# Patient Record
Sex: MALE | Race: WHITE | NOT HISPANIC OR LATINO | Employment: FULL TIME
[De-identification: names, ages, dates, MRNs, and addresses within clinical notes are randomized per-mention and may not be internally consistent; named-entity substitution may affect disease eponyms.]

---

## 2019-11-04 ENCOUNTER — HEALTH MAINTENANCE LETTER (OUTPATIENT)
Age: 49
End: 2019-11-04

## 2020-03-04 ENCOUNTER — OFFICE VISIT (OUTPATIENT)
Dept: FAMILY MEDICINE | Facility: CLINIC | Age: 50
End: 2020-03-04
Payer: COMMERCIAL

## 2020-03-04 VITALS
HEART RATE: 76 BPM | TEMPERATURE: 96.2 F | SYSTOLIC BLOOD PRESSURE: 130 MMHG | DIASTOLIC BLOOD PRESSURE: 82 MMHG | BODY MASS INDEX: 32.14 KG/M2 | WEIGHT: 217 LBS | HEIGHT: 69 IN

## 2020-03-04 DIAGNOSIS — R06.83 SNORING: ICD-10-CM

## 2020-03-04 DIAGNOSIS — F33.1 MODERATE EPISODE OF RECURRENT MAJOR DEPRESSIVE DISORDER (H): ICD-10-CM

## 2020-03-04 DIAGNOSIS — Z13.220 SCREENING FOR LIPID DISORDERS: ICD-10-CM

## 2020-03-04 DIAGNOSIS — I83.813 VARICOSE VEINS OF BOTH LOWER EXTREMITIES WITH PAIN: ICD-10-CM

## 2020-03-04 DIAGNOSIS — Z13.1 SCREENING FOR DIABETES MELLITUS: ICD-10-CM

## 2020-03-04 DIAGNOSIS — R45.851 SUICIDAL IDEATION: ICD-10-CM

## 2020-03-04 DIAGNOSIS — Z12.5 SCREENING FOR PROSTATE CANCER: ICD-10-CM

## 2020-03-04 DIAGNOSIS — Z23 NEED FOR VACCINATION: ICD-10-CM

## 2020-03-04 DIAGNOSIS — F43.10 PTSD (POST-TRAUMATIC STRESS DISORDER): ICD-10-CM

## 2020-03-04 DIAGNOSIS — Z00.00 ROUTINE GENERAL MEDICAL EXAMINATION AT A HEALTH CARE FACILITY: Primary | ICD-10-CM

## 2020-03-04 DIAGNOSIS — Z12.11 ENCOUNTER FOR SCREENING COLONOSCOPY: ICD-10-CM

## 2020-03-04 DIAGNOSIS — Z11.3 SCREEN FOR STD (SEXUALLY TRANSMITTED DISEASE): ICD-10-CM

## 2020-03-04 LAB
ALBUMIN SERPL-MCNC: 4.1 G/DL (ref 3.4–5)
ALP SERPL-CCNC: 84 U/L (ref 40–150)
ALT SERPL W P-5'-P-CCNC: 40 U/L (ref 0–70)
ANION GAP SERPL CALCULATED.3IONS-SCNC: 6 MMOL/L (ref 3–14)
AST SERPL W P-5'-P-CCNC: 15 U/L (ref 0–45)
BILIRUB SERPL-MCNC: 1 MG/DL (ref 0.2–1.3)
BUN SERPL-MCNC: 10 MG/DL (ref 7–30)
CALCIUM SERPL-MCNC: 9.1 MG/DL (ref 8.5–10.1)
CHLORIDE SERPL-SCNC: 104 MMOL/L (ref 94–109)
CHOLEST SERPL-MCNC: 153 MG/DL
CO2 SERPL-SCNC: 27 MMOL/L (ref 20–32)
CREAT SERPL-MCNC: 0.79 MG/DL (ref 0.66–1.25)
GFR SERPL CREATININE-BSD FRML MDRD: >90 ML/MIN/{1.73_M2}
GLUCOSE SERPL-MCNC: 102 MG/DL (ref 70–99)
HBV SURFACE AG SERPL QL IA: NONREACTIVE
HCV AB SERPL QL IA: NONREACTIVE
HDLC SERPL-MCNC: 35 MG/DL
HIV 1+2 AB+HIV1 P24 AG SERPL QL IA: NONREACTIVE
LDLC SERPL CALC-MCNC: 101 MG/DL
NONHDLC SERPL-MCNC: 118 MG/DL
POTASSIUM SERPL-SCNC: 3.6 MMOL/L (ref 3.4–5.3)
PROT SERPL-MCNC: 7.6 G/DL (ref 6.8–8.8)
PSA SERPL-ACNC: 0.77 UG/L (ref 0–4)
SODIUM SERPL-SCNC: 137 MMOL/L (ref 133–144)
TRIGL SERPL-MCNC: 83 MG/DL

## 2020-03-04 PROCEDURE — 90714 TD VACC NO PRESV 7 YRS+ IM: CPT | Performed by: NURSE PRACTITIONER

## 2020-03-04 PROCEDURE — 87340 HEPATITIS B SURFACE AG IA: CPT | Performed by: NURSE PRACTITIONER

## 2020-03-04 PROCEDURE — G0103 PSA SCREENING: HCPCS | Performed by: NURSE PRACTITIONER

## 2020-03-04 PROCEDURE — 87491 CHLMYD TRACH DNA AMP PROBE: CPT | Performed by: NURSE PRACTITIONER

## 2020-03-04 PROCEDURE — 90471 IMMUNIZATION ADMIN: CPT | Performed by: NURSE PRACTITIONER

## 2020-03-04 PROCEDURE — 36415 COLL VENOUS BLD VENIPUNCTURE: CPT | Performed by: NURSE PRACTITIONER

## 2020-03-04 PROCEDURE — 87591 N.GONORRHOEAE DNA AMP PROB: CPT | Performed by: NURSE PRACTITIONER

## 2020-03-04 PROCEDURE — 99386 PREV VISIT NEW AGE 40-64: CPT | Mod: 25 | Performed by: NURSE PRACTITIONER

## 2020-03-04 PROCEDURE — 87389 HIV-1 AG W/HIV-1&-2 AB AG IA: CPT | Performed by: NURSE PRACTITIONER

## 2020-03-04 PROCEDURE — 86780 TREPONEMA PALLIDUM: CPT | Performed by: NURSE PRACTITIONER

## 2020-03-04 PROCEDURE — 80053 COMPREHEN METABOLIC PANEL: CPT | Performed by: NURSE PRACTITIONER

## 2020-03-04 PROCEDURE — 80061 LIPID PANEL: CPT | Performed by: NURSE PRACTITIONER

## 2020-03-04 PROCEDURE — 86803 HEPATITIS C AB TEST: CPT | Performed by: NURSE PRACTITIONER

## 2020-03-04 ASSESSMENT — ANXIETY QUESTIONNAIRES
2. NOT BEING ABLE TO STOP OR CONTROL WORRYING: SEVERAL DAYS
7. FEELING AFRAID AS IF SOMETHING AWFUL MIGHT HAPPEN: SEVERAL DAYS
1. FEELING NERVOUS, ANXIOUS, OR ON EDGE: SEVERAL DAYS
5. BEING SO RESTLESS THAT IT IS HARD TO SIT STILL: SEVERAL DAYS
3. WORRYING TOO MUCH ABOUT DIFFERENT THINGS: SEVERAL DAYS
GAD7 TOTAL SCORE: 7
IF YOU CHECKED OFF ANY PROBLEMS ON THIS QUESTIONNAIRE, HOW DIFFICULT HAVE THESE PROBLEMS MADE IT FOR YOU TO DO YOUR WORK, TAKE CARE OF THINGS AT HOME, OR GET ALONG WITH OTHER PEOPLE: SOMEWHAT DIFFICULT
6. BECOMING EASILY ANNOYED OR IRRITABLE: SEVERAL DAYS

## 2020-03-04 ASSESSMENT — MIFFLIN-ST. JEOR: SCORE: 1838.65

## 2020-03-04 ASSESSMENT — PATIENT HEALTH QUESTIONNAIRE - PHQ9
5. POOR APPETITE OR OVEREATING: SEVERAL DAYS
SUM OF ALL RESPONSES TO PHQ QUESTIONS 1-9: 11

## 2020-03-04 NOTE — PROGRESS NOTES
3  SUBJECTIVE:   CC: Lakhwinder Solorio is an 50 year old male who presents for preventive health visit.     Healthy Habits:    Do you get at least three servings of calcium containing foods daily (dairy, green leafy vegetables, etc.)? No     Amount of exercise or daily activities, outside of work: 1 day(s) per week    Problems taking medications regularly No    Medication side effects: Yes ED     Have you had an eye exam in the past two years? no    Do you see a dentist twice per year? yes    Do you have sleep apnea, excessive snoring or daytime drowsiness?yes, wife states he stops breathing       PROBLEMS TO ADD ON...     1. Varicose veins (right leg only). These can be painful at time (especially after standing for long periods).     2. Snoring / stopping breathing. His wife reports that he often stops breathing at night. He also snores nightly.     3. PTSD / Depression / Suicidal Ideation. He has recently been seeing a psychiatrist for these issues and has started fluoxetine. He is seeing that provider tomorrow. No acute issues today. He does note ED since starting this medication.       Today's PHQ-2 Score:   PHQ-2 ( 1999 Pfizer) 5/26/2015 2/11/2013   Q1: Little interest or pleasure in doing things 0 0   Q2: Feeling down, depressed or hopeless 0 0   PHQ-2 Score 0 0       Abuse: Current or Past(Physical, Sexual or Emotional)- NO  Do you feel safe in your environment? YES        Social History     Tobacco Use     Smoking status: Never Smoker     Smokeless tobacco: Never Used   Substance Use Topics     Alcohol use: Yes     Comment: social     If you drink alcohol do you typically have >3 drinks per day or >7 drinks per week? No                      Last PSA: No results found for: PSA    Reviewed orders with patient. Reviewed health maintenance and updated orders accordingly - Yes  Lab work is in process    Reviewed and updated as needed this visit by clinical staff  Tobacco  Allergies  Meds  Problems  Med Hx   "Surg Hx  Fam Hx  Soc Hx          Reviewed and updated as needed this visit by Provider  Tobacco  Allergies  Meds  Problems  Med Hx  Surg Hx  Fam Hx          ROS:  CONSTITUTIONAL: NEGATIVE for fever, chills, change in weight  INTEGUMENTARY/SKIN: NEGATIVE for worrisome rashes, moles or lesions  EYES: NEGATIVE for vision changes or irritation  ENT: NEGATIVE for ear, mouth and throat problems  RESP: See HPI regarding snoring and probable apnea  CV: NEGATIVE for chest pain, palpitations or peripheral edema; See HPI regarding varicose veins.   GI: NEGATIVE for nausea, abdominal pain, heartburn, or change in bowel habits   male: negative for dysuria, hematuria, decreased urinary stream, erectile dysfunction, urethral discharge  MUSCULOSKELETAL: NEGATIVE for significant arthralgias or myalgia  NEURO: NEGATIVE for weakness, dizziness or paresthesias  PSYCHIATRIC: See HPI    OBJECTIVE:   /82 (BP Location: Left arm, Patient Position: Chair, Cuff Size: Adult Regular)   Pulse 76   Temp 96.2  F (35.7  C) (Tympanic)   Ht 1.759 m (5' 9.25\")   Wt 98.4 kg (217 lb)   BMI 31.81 kg/m    EXAM:  GENERAL: healthy, alert and no distress  EYES: Eyes grossly normal to inspection, PERRL and conjunctivae and sclerae normal  HENT: ear canals and TM's normal, nose and mouth without ulcers or lesions; Large tonsils.   NECK: no adenopathy, no asymmetry, masses, or scars and thyroid normal to palpation  RESP: lungs clear to auscultation - no rales, rhonchi or wheezes  CV: regular rate and rhythm, normal S1 S2, no S3 or S4, no murmur, click or rub, no peripheral edema and peripheral pulses strong; Extensive varicosities (no inflammation) in right lower extremity (below the knee)  ABDOMEN: soft, nontender, no hepatosplenomegaly, no masses and bowel sounds normal  MS: no gross musculoskeletal defects noted, no edema  SKIN: no suspicious lesions or rashes  NEURO: Normal strength and tone, mentation intact and speech normal  PSYCH: " mentation appears normal, affect normal/bright    Diagnostic Test Results:  No results found for this or any previous visit (from the past 24 hour(s)).    ASSESSMENT/PLAN:   Lakhwinder was seen today for physical.    Diagnoses and all orders for this visit:    Routine general medical examination at a health care facility  Comment: Feels generally-well. Exam without concerning findings. See below for acute and chronic issues. Will screen fasting BG and lipids. Td given today.     Screening for diabetes mellitus  -     Comprehensive metabolic panel    Screening for lipid disorders  -     Lipid panel reflex to direct LDL Fasting    Suicidal ideation  Comment: Follows with psychiatry. No acute issues today.     Moderate episode of recurrent major depressive disorder (H)  Comment: Follows with psychiatry. No acute issues today.     PTSD (post-traumatic stress disorder)  Comment: Follows with psychiatry. No acute issues today.     Varicose veins of both lower extremities with pain  Comment: Began after an injury (fall from a ladder) to his right thigh. Will send to vascular surgery for further evaluation.   -     VASCULAR SURGERY REFERRAL; Future    Snoring  Comment: PENG likely based on his account of his symptoms (and narrowed airway on exam)  -     SLEEP EVALUATION & MANAGEMENT REFERRAL - ADULT -[facility] [phone]  (Age 18 and up); Future    Screen for STD (sexually transmitted disease)  -     Hepatitis C antibody  -     HIV Antigen Antibody Combo  -     Hepatitis B surface antigen  -     Treponema Abs w Reflex to RPR and Titer  -     NEISSERIA GONORRHOEA PCR  -     CHLAMYDIA TRACHOMATIS PCR    Encounter for screening colonoscopy  -     GASTROENTEROLOGY ADULT REF PROCEDURE ONLY Delmi Perez [phone]; No Provider Preference    Screening for prostate cancer  -     Prostate spec antigen screen    Need for vaccination  -     TD PRSERV FREE >=7 YRS ADS IM [35340]  -     1st  Administration  " [03593]        COUNSELING:  Reviewed preventive health counseling, as reflected in patient instructions    Estimated body mass index is 31.81 kg/m  as calculated from the following:    Height as of this encounter: 1.759 m (5' 9.25\").    Weight as of this encounter: 98.4 kg (217 lb).    Weight management plan: Discussed healthy diet and exercise guidelines     reports that he has never smoked. He has never used smokeless tobacco.      Counseling Resources:  ATP IV Guidelines  Pooled Cohorts Equation Calculator  FRAX Risk Assessment  ICSI Preventive Guidelines  Dietary Guidelines for Americans, 2010  USDA's MyPlate  ASA Prophylaxis  Lung CA Screening    Jerad Villalobos NP  [facility] [place]  "

## 2020-03-05 ENCOUNTER — VIRTUAL VISIT (OUTPATIENT)
Dept: FAMILY MEDICINE | Facility: CLINIC | Age: 50
End: 2020-03-05
Payer: COMMERCIAL

## 2020-03-05 DIAGNOSIS — N52.2 DRUG-INDUCED ERECTILE DYSFUNCTION: Primary | ICD-10-CM

## 2020-03-05 LAB
C TRACH DNA SPEC QL NAA+PROBE: NEGATIVE
N GONORRHOEA DNA SPEC QL NAA+PROBE: NEGATIVE
SPECIMEN SOURCE: NORMAL
SPECIMEN SOURCE: NORMAL
T PALLIDUM AB SER QL: NONREACTIVE

## 2020-03-05 PROCEDURE — 99442 ZZC PHYSICIAN TELEPHONE EVALUATION 11-20 MIN: CPT | Performed by: NURSE PRACTITIONER

## 2020-03-05 RX ORDER — SILDENAFIL 50 MG/1
50 TABLET, FILM COATED ORAL DAILY PRN
Qty: 30 TABLET | Refills: 1 | Status: SHIPPED | OUTPATIENT
Start: 2020-03-05 | End: 2022-04-08

## 2020-03-05 ASSESSMENT — ANXIETY QUESTIONNAIRES: GAD7 TOTAL SCORE: 7

## 2020-03-05 ASSESSMENT — ASTHMA QUESTIONNAIRES: ACT_TOTALSCORE: 25

## 2020-03-05 NOTE — PROGRESS NOTES
"Lakhwinder Solorio is a 50 year old male who is being evaluated via a billable telephone visit.      The patient has been notified of following:     \"This telephone visit will be conducted via a call between you and your physician/provider. We have found that certain health care needs can be provided without the need for a physical exam.  This service lets us provide the care you need with a short phone conversation.  If a prescription is necessary we can send it directly to your pharmacy.  If lab work is needed we can place an order for that and you can then stop by our lab to have the test done at a later time.    If during the course of the call the physician/provider feels a telephone visit is not appropriate, you will not be charged for this service.\"     Consent has been obtained for this service by 1 care team member: yes. See the scanned image in the medical record.    Lakhwinder Solorio complains of    Chief Complaint   Patient presents with     Consult     discuss EP and Prozac side effects        I have reviewed and updated the patient's Past Medical History, Social History, Family History and Medication List.    ALLERGIES  Patient has no known allergies.    Jayy FERNANDES CMA  (MA signature)    HPI: Lakhwinder was recently started on fluoxetine 20 mg by his psychiatrist. Since that time, however, he has begun to experience erectile dysfunction with every sexual encounter. He states that he is unable to achieve an erection at all. He suspects that he would like to talk about a medication (suggested by his psychiatrist today who did not wish to amend his depression medication regimen due to this). He suspects that he would need to use a medication 2-3 times per week. He has never used an agent for this purpose before.     Additional provider notes:       Assessment/Plan:  (N52.2) Drug-induced erectile dysfunction  (primary encounter diagnosis)  Comment: ED as side effect of new depression medication. He visited with psychiatrist " who did not wish to switch therapy or add bupropion. Instead, she recommended that he trial an ED medication for the time being. I think that this is reasonable, and he appears to be a safe candidate. I have recommended that he check his BP frequently after starting this and alert me with the development of any potential side effects. He agrees to do so. Discussed risks, benefits, alternatives, potential side effects, and proper administration of new medication / treatment (including avoiding the use of nitrates). Agrees with plan of care. All questions answered.     Plan: sildenafil (VIAGRA) 50 MG tablet          I have reviewed the note as documented above.  This accurately captures the substance of my conversation with the patient.      Total time of call between patient and provider was 12 minutes     Jerad Villalobos NP

## 2020-03-09 ENCOUNTER — HOSPITAL ENCOUNTER (OUTPATIENT)
Facility: CLINIC | Age: 50
End: 2020-03-09
Attending: SPECIALIST | Admitting: SPECIALIST
Payer: COMMERCIAL

## 2020-03-10 ENCOUNTER — OFFICE VISIT (OUTPATIENT)
Dept: VASCULAR SURGERY | Facility: CLINIC | Age: 50
End: 2020-03-10
Attending: NURSE PRACTITIONER
Payer: COMMERCIAL

## 2020-03-10 ENCOUNTER — MEDICAL CORRESPONDENCE (OUTPATIENT)
Dept: HEALTH INFORMATION MANAGEMENT | Facility: CLINIC | Age: 50
End: 2020-03-10

## 2020-03-10 ENCOUNTER — TRANSFERRED RECORDS (OUTPATIENT)
Dept: HEALTH INFORMATION MANAGEMENT | Facility: CLINIC | Age: 50
End: 2020-03-10

## 2020-03-10 DIAGNOSIS — I83.893 VARICOSE VEINS OF LEG WITH EDEMA, BILATERAL: Primary | ICD-10-CM

## 2020-03-10 DIAGNOSIS — I83.813 VARICOSE VEINS OF BOTH LOWER EXTREMITIES WITH PAIN: ICD-10-CM

## 2020-03-10 PROCEDURE — 99207 ZZC VEINSOLUTIONS FREE SCREENING: CPT | Performed by: SURGERY

## 2020-03-10 NOTE — PROGRESS NOTES
[facility] Free screening    Lakhwinder Solorio is a 50-year-old gentleman who has had varicose veins of his right greater than left lower extremity over the last several years.  He is referred by Jerad Villalobos NP.  The patient's greatest concern is of the pain and swelling of his right ankle but he is also developed a dermatitis which has been difficult to heal.    He wears compression on a daily basis hands and has done so for years.  The elastic at the top of the compression stocking rubs on the varicosity just below his knee increasing his discomfort.  He has not been able to get this to heal.    He has no history of deep vein thrombosis or superficial thrombophlebitis.    12 point review of systems was significant for night sweats, fatigue, 20 pound weight loss, rare chest discomfort, headaches, diarrhea, constipation, blood in stool, indigestion/reflux, heartburn, poor appetite, back and neck pain, leg pain, leg swelling, rash and pruritus    VCSS 8, CEAP 4 right lower extremity; VCSS 4, CEAP 3 left lower extremity    We discussed options of continued conservative measures with use of compression hose, leg elevation, dietary measures and exercise.  He is work to lose weight to help with his pain and has been wearing compression on a regular basis.  Risks of conservative measures including worsening of the dermatitis, enlargement of the varicose veins, superficial thrombophlebitis or bleeding were discussed.  He voiced understanding and his questions were answered.    He has greater than 10 mm varicosities in a cluster just below the right knee and 8 mm varicosities extending down the medial leg somewhat anteriorly to the ankle.  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    He will return for a bilateral lower extremity venous duplex ultrasound    LYNDSAY Adikns MD    [facility] NEW PATIENT:

## 2020-03-10 NOTE — LETTER
3/10/2020         RE: Lakhwinder Solorio  [address] [id number]        Dear Colleague,    Thank you for referring your patient, Lakhwinder Solorio, to the [facility]. Please see a copy of my visit note below.    Vein solutions Free screening    Lakhwinder Solorio is a 50-year-old gentleman who has had varicose veins of his right greater than left lower extremity over the last several years.  He is referred by Jerad Villalobos NP.  The patient's greatest concern is of the pain and swelling of his right ankle but he is also developed a dermatitis which has been difficult to heal.    He wears compression on a daily basis hands and has done so for years.  The elastic at the top of the compression stocking rubs on the varicosity just below his knee increasing his discomfort.  He has not been able to get this to heal.    He has no history of deep vein thrombosis or superficial thrombophlebitis.    12 point review of systems was significant for night sweats, fatigue, 20 pound weight loss, rare chest discomfort, headaches, diarrhea, constipation, blood in stool, indigestion/reflux, heartburn, poor appetite, back and neck pain, leg pain, leg swelling, rash and pruritus    VCSS 8, CEAP 4 right lower extremity; VCSS 4, CEAP 3 left lower extremity    We discussed options of continued conservative measures with use of compression hose, leg elevation, dietary measures and exercise.  He is work to lose weight to help with his pain and has been wearing compression on a regular basis.  Risks of conservative measures including worsening of the dermatitis, enlargement of the varicose veins, superficial thrombophlebitis or bleeding were discussed.  He voiced understanding and his questions were answered.    He has greater than 10 mm varicosities in a cluster just below the right knee and 8 mm varicosities extending down the medial leg somewhat anteriorly to the ankle.  There is corona phlebectasia of  the right medial ankle with 1-2+ edema.    He will return for a bilateral lower extremity venous duplex ultrasound    LYNDSAY Adkins MD    [facility] NEW PATIENT:                Again, thank you for allowing me to participate in the care of your patient.        Sincerely,        Alfonso Adkins MD

## 2020-07-10 ENCOUNTER — VIRTUAL VISIT (OUTPATIENT)
Dept: FAMILY MEDICINE | Facility: OTHER | Age: 50
End: 2020-07-10

## 2020-07-10 NOTE — PROGRESS NOTES
"Date: 07/10/2020 16:18:41  Clinician: Amelia Briscoe  Clinician NPI: [id number]  Patient: Lakhwinder Solorio  Patient : 1970  Patient [address] [facility] [address] [place] [address]  Patient Phone: [phone]  Visit Protocol: URI  Patient Summary:  Lakhwinder is a 50 year old ( : 1970 ) male who initiated a Visit for COVID-19 (Coronavirus) evaluation and screening. When asked the question \"Please sign me up to receive news, health information and promotions. \", Lakhwinder responded \"No\".    Lakhwinder states his symptoms started gradually 3-4 days ago.   His symptoms consist of nausea, diarrhea, malaise, myalgia, and a cough.   Symptom details   Cough: Lakhwinder coughs a few times an hour and his cough is not more bothersome at night. Phlegm does not come into his throat when he coughs. He does not believe his cough is caused by post-nasal drip.    Lakhwinder denies having wheezing, teeth pain, ageusia, vomiting, rhinitis, ear pain, headache, chills, sore throat, enlarged lymph nodes, anosmia, facial pain or pressure, fever, and nasal congestion. He also denies having recent facial or sinus surgery in the past 60 days, taking antibiotic medication in the past month, and double sickening (worsening symptoms after initial improvement). He is not experiencing dyspnea.   Precipitating events  He has not recently been exposed to someone with influenza. Lakhwinder has not been in close contact with any high risk individuals.   Pertinent COVID-19 (Coronavirus) information  In the past 14 days, Lakhwinder has not worked in a congregate living setting.   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 Additional job details as reported by the patient (free text): ,    Lakhwinder also has not lived in a congregate living setting in the past 14 days. He does not live with a healthcare worker.   Lakhwinder has not had a close contact with a " laboratory-confirmed COVID-19 patient within 14 days of symptom onset.   Pertinent medical history  Lakhwinder does not need a return to work/school note.   Weight: 205 lbs   Lakhwinder does not smoke or use smokeless tobacco.   Weight: 205 lbs    MEDICATIONS: fluoxetine oral, ALLERGIES: NKDA  Clinician Response:  Dear Lakhwinder,   Your symptoms show that you may have coronavirus (COVID-19). This illness can cause fever, cough and trouble breathing. Many people get a mild case and get better on their own. Some people can get very sick.  What should I do?  We would like to test you for this virus.   1. Please call [phone] to schedule your visit. Explain that you were referred by [facility] to have a COVID-19 test. [facility].  The following will serve as your written order for this COVID Test, ordered by me, for the indication of suspected COVID [Z20.828]: The test will be ordered in [employer], our electronic health record, after you are scheduled. It will show as ordered and authorized by Wilber Martinez MD.  Order: COVID-19 (Coronavirus) PCR for SYMPTOMATIC testing from [facility].      2. When it's time for your COVID test:  Stay at least 6 feet away from others. (If someone will drive you to your test, stay in the backseat, as far away from the  as you can.)   Cover your mouth and nose with a mask, tissue or washcloth.  Go straight to the testing site. Don't make any stops on the way there or back.      3.Starting now: Stay home and away from others (self-isolate) until:   You've had no fever---and no medicine that reduces fever---for 3 full days (72 hours). And...   Your other symptoms have gotten better. For example, your cough or breathing has improved. And...   At least 10 days have passed since your symptoms started.       During this time, don't leave the house except for testing or medical care.   Stay in your own room, even for meals. Use your own bathroom if you can.   Stay away from others  "in your home. No hugging, kissing or shaking hands. No visitors.  Don't go to work, school or anywhere else.    Clean \"high touch\" surfaces often (doorknobs, counters, handles, etc.). Use a household cleaning spray or wipes. You'll find a full list of  on the EPA website: www.epa.gov/pesticide-registration/list-n-disinfectants-use-against-sars-cov-2.   Cover your mouth and nose with a mask, tissue or washcloth to avoid spreading germs.  Wash your hands and face often. Use soap and water.  Caregivers in these groups are at risk for severe illness due to COVID-19:  o People 65 years and older  o People who live in a nursing home or long-term care facility  o People with chronic disease (lung, heart, cancer, diabetes, kidney, liver, immunologic)  o People who have a weakened immune system, including those who:   Are in cancer treatment  Take medicine that weakens the immune system, such as corticosteroids  Had a bone marrow or organ transplant  Have an immune deficiency  Have poorly controlled HIV or AIDS  Are obese (body mass index of 40 or higher)  Smoke regularly   o Caregivers should wear gloves while washing dishes, handling laundry and cleaning bedrooms and bathrooms.  o Use caution when washing and drying laundry: Don't shake dirty laundry, and use the warmest water setting that you can.  o For more tips, go to www.cdc.gov/coronavirus/2019-ncov/downloads/10Things.pdf.       How can I take care of myself?   Get lots of rest. Drink extra fluids (unless a doctor has told you not to).   Take Tylenol (acetaminophen) for fever or pain. If you have liver or kidney problems, ask your family doctor if it's okay to take Tylenol.   Adults can take either:    650 mg (two 325 mg pills) every 4 to 6 hours, or...   1,000 mg (two 500 mg pills) every 8 hours as needed.    Note: Don't take more than 3,000 mg in one day. Acetaminophen is found in many medicines (both prescribed and over-the-counter medicines). Read all " labels to be sure you don't take too much.   For children, check the Tylenol bottle for the right dose. The dose is based on the child's age or weight.    If you have other health problems (like cancer, heart failure, an organ transplant or severe kidney disease): Call your specialty clinic if you don't feel better in the next 2 days.       Know when to call 911. Emergency warning signs include:    Trouble breathing or shortness of breath Pain or pressure in the chest that doesn't go away Feeling confused like you haven't felt before, or not being able to wake up Bluish-colored lips or face.  Where can I get more information?   [facility] -- About COVID-19: [email or web address]   CDC -- What to Do If You're Sick: www.cdc.gov/coronavirus/2019-ncov/about/steps-when-sick.html   [facility] -- Ending Home Isolation: www.cdc.gov/coronavirus/2019-ncov/hcp/disposition-in-home-patients.html   [facility] -- Caring for Someone: www.cdc.gov/coronavirus/2019-ncov/if-you-are-sick/care-for-someone.html   [facility] -- Interim Guidance for Hospital Discharge to Home: www.[facility].[place]/diseases/coronavirus/hcp/hospdischarge.pdf   [facility] clinical trials (COVID-19 research studies): clinicalaffairs.[facility]-clinical-trials    Below are the COVID-19 hotlines at the [facility] ([facility]). Interpreters are available.    For health questions: Call [phone] or 1-660.644.1965 (7 a.m. to 7 p.m.) For questions about schools and childcare: Call 487-032-6153 or 1-850.569.7097 (7 a.m. to 7 p.m.)    Diagnosis: Myalgia  Diagnosis ICD: M79.1

## 2020-07-11 ENCOUNTER — AMBULATORY - HEALTHEAST (OUTPATIENT)
Dept: FAMILY MEDICINE | Facility: CLINIC | Age: 50
End: 2020-07-11

## 2020-07-11 DIAGNOSIS — Z20.822 SUSPECTED COVID-19 VIRUS INFECTION: ICD-10-CM

## 2020-07-12 ENCOUNTER — AMBULATORY - HEALTHEAST (OUTPATIENT)
Dept: FAMILY MEDICINE | Facility: CLINIC | Age: 50
End: 2020-07-12

## 2020-07-12 DIAGNOSIS — Z20.822 SUSPECTED COVID-19 VIRUS INFECTION: ICD-10-CM

## 2020-07-16 ENCOUNTER — COMMUNICATION - HEALTHEAST (OUTPATIENT)
Dept: FAMILY MEDICINE | Facility: CLINIC | Age: 50
End: 2020-07-16

## 2020-09-08 DIAGNOSIS — Z11.59 ENCOUNTER FOR SCREENING FOR OTHER VIRAL DISEASES: Primary | ICD-10-CM

## 2020-09-14 VITALS — HEIGHT: 70 IN | BODY MASS INDEX: 29.35 KG/M2 | WEIGHT: 205 LBS

## 2020-09-14 ASSESSMENT — MIFFLIN-ST. JEOR: SCORE: 1796.12

## 2020-09-14 NOTE — PATIENT INSTRUCTIONS
"MY TREATMENT INFORMATION FOR SLEEP APNEA-  Lakhwinder Solorio    DOCTOR : Lazaro Keane MD, MD  SLEEP CENTER :      MY CONTACT NUMBER:     Am I having a sleep study at a sleep center?  Make sure you have an appointment for the study before you leave!    Am I having a home sleep study?  Watch this video:  /drop off device-   Https://www.truedashube.com/watch?v=yGGFBdELGhk  Disposable device sent out require phone/computer application-   https://www.truedashube.com/watch?v=BCce_vbiwxE  Please verify your insurance coverage with your insurance carrier    Frequently asked questions:  1. What is Obstructive Sleep Apnea (PENG)? PENG is the most common type of sleep apnea. Apnea means, \"without breath.\"  Apnea is most often caused by narrowing or collapse of the upper airway as muscles relax during sleep.   Almost everyone has occasional apneas. Most people with sleep apnea have had brief interruptions at night frequently for many years.  The severity of sleep apnea is related to how frequent and severe the events are.   2. What are the consequences of PENG? Symptoms include: feeling sleepy during the day, snoring loudly, gasping or stopping of breathing, trouble sleeping, and occasionally morning headaches or heartburn at night.  Sleepiness can be serious and even increase the risk of falling asleep while driving. Other health consequences may include development of high blood pressure and other cardiovascular disease in persons who are susceptible. Untreated PENG  can contribute to heart disease, stroke and diabetes.   3. What are the treatment options? In most situations, sleep apnea is a lifelong disease that must be managed with daily therapy. Medications are not effective for sleep apnea and surgery is generally not considered until other therapies have been tried. Your treatment is your choice . Continuous Positive Airway (CPAP) works right away and is the therapy that is effective in nearly everyone. An oral device to hold " your jaw forward is usually the next most reliable option. Other options include postioning devices (to keep you off your back), weight loss, and surgery including a tongue pacing device. There is more detail about some of these options below.    Important tips for using CPAP and similar devices   Know your equipment:  CPAP is continuous positive airway pressure that prevents obstructive sleep apnea by keeping the throat from collapsing while you are sleeping. In most cases, the device is  smart  and can slowly self-adjusts if your throat collapses and keeps a record every day of how well you are treated-this information is available to you and your care team.  BPAP is bilevel positive airway pressure that keeps your throat open and also assists each breath with a pressure boost to maintain adequate breathing.  Special kinds of BPAP are used in patients who have inadequate breathing from lung or heart disease. In most cases, the device is  smart  and can slowly self-adjusts to assist breathing. Like CPAP, the device keeps a record of how well you are treated.  Your mask is your connection to the device. You get to choose what feels most comfortable and the staff will help to make sure if fits. Here: are some examples of the different masks that are available:       Key points to remember on your journey with sleep apnea:  1. Sleep study.  PAP devices often need to be adjusted during a sleep study to show that they are effective and adjusted right.  2. Good tips to remember: Try wearing just the mask during a quiet time during the day so your body adapts to wearing it. A humidifier is recommended for comfort in most cases to prevent drying of your nose and throat. Allergy medication from your provider may help you if you are having nasal congestion.  3. Getting settled-in. It takes more than one night for most of us to get used to wearing a mask. Try wearing just the mask during a quiet time during the day so your  body adapts to wearing it. A humidifier is recommended for comfort in most cases. Our team will work with you carefully on the first day and will be in contact within 4 days and again at 2 and 4 weeks for advice and remote device adjustments. Your therapy is evaluated by the device each day.   4. Use it every night. The more you are able to sleep naturally for 7-8 hours, the more likely you will have good sleep and to prevent health risks or symptoms from sleep apnea. Even if you use it 4 hours it helps. Occasionally all of us are unable to use a medical therapy, in sleep apnea, it is not dangerous to miss one night.   5. Communicate. Call our skilled team on the number provided on the first day if your visit for problems that make it difficult to wear the device. Over 2 out of 3 patients can learn to wear the device long-term with help from our team. Remember to call our team or your sleep providers if you are unable to wear the device as we may have other solutions for those who cannot adapt to mask CPAP therapy. It is recommended that you sleep your sleep provider within the first 3 months and yearly after that if you are not having problems.   6. Use it for your health. We encourage use of CPAP masks during daytime quiet periods to allow your face and brain to adapt to the sensation of CPAP so that it will be a more natural sensation to awaken to at night or during naps. This can be very useful during the first few weeks or months of adapting to CPAP though it does not help medically to wear CPAP during wakefulness and  should not be used as a strategy just to meet guidelines.  7. Take care of your equipment. Make sure you clean your mask and tubing using directions every day and that your filter and mask are replaced as recommended or if they are not working.     BESIDES CPAP, WHAT OTHER THERAPIES ARE THERE?    Positioning Device  Positioning devices are generally used when sleep apnea is mild and only occurs  on your back.This example shows a pillow that straps around the waist. It may be appropriate for those whose sleep study shows milder sleep apnea that occurs primarily when lying flat on one's back. Preliminary studies have shown benefit but effectiveness at home may need to be verified by a home sleep test. These devices are generally not covered by medical insurance.  Examples of devices that maintain sleeping on the back to prevent snoring and mild sleep apnea.    Belt type body positioner  Http://Namshi[employer]/    Electronic reminder  Http://nightshifttherapy.com/  Http://www.Boats.com[employer]au/      Oral Appliance  What is oral appliance therapy?  An oral appliance device fits on your teeth at night like a retainer used after having braces. The device is made by a specialized dentist and requires several visits over 1-2 months before a manufactured device is made to fit your teeth and is adjusted to prevent your sleep apnea. Once an oral device is working properly, snoring should be improved. A home sleep test may be recommended at that time if to determine whether the sleep apnea is adequately treated.       Some things to remember:  -Oral devices are often, but not always, covered by your medical insurance. Be sure to check with your insurance provider.   -If you are referred for oral therapy, you will be given a list of specialized dentists to consider or you may choose to visit the Web site of the American Academy of Dental Sleep Medicine  -Oral devices are less likely to work if you have severe sleep apnea or are extremely overweight.     More detailed information  An oral appliance is a small acrylic device that fits over the upper and lower teeth  (similar to a retainer or a mouth guard). This device slightly moves jaw forward, which moves the base of the tongue forward, opens the airway, improves breathing for effective treat snoring and obstructive sleep apnea in perhaps 7 out of 10 people .  The best  working devices are custom-made by a dental device  after a mold is made of the teeth 1, 2, 3.  When is an oral appliance indicated?  Oral appliance therapy is recommended as a first-line treatment for patients with primary snoring, mild sleep apnea, and for patients with moderate sleep apnea who prefer appliance therapy to use of CPAP4, 5. Severity of sleep apnea is determined by sleep testing and is based on the number of respiratory events per hour of sleep.   How successful is oral appliance therapy?  The success rate of oral appliance therapy in patients with mild sleep apnea is 75-80% while in patients with moderate sleep apnea it is 50-70%. The chance of success in patients with severe sleep apnea is 40-50%. The research also shows that oral appliances have a beneficial effect on the cardiovascular health of PENG patients at the same magnitude as CPAP therapy7.  Oral appliances should be a second-line treatment in cases of severe sleep apnea, but if not completely successful then a combination therapy utilizing CPAP plus oral appliance therapy may be effective. Oral appliances tend to be effective in a broad range of patients although studies show that the patients who have the highest success are females, younger patients, those with milder disease, and less severe obesity. 3, 6.   Finding a dentist that practices dental sleep medicine  Specific training is available through the American Academy of Dental Sleep Medicine for dentists interested in working in the field of sleep. To find a dentist who is educated in the field of sleep and the use of oral appliances, near you, visit the Web site of the American Academy of Dental Sleep Medicine.    References  1. Mp et al. Objectively measured vs self-reported compliance during oral appliance therapy for sleep-disordered breathing. Chest 2013; 144(5): 8292-8255.  2. Ángel et al. Objective measurement of compliance during oral appliance  therapy for sleep-disordered breathing. Thorax 2013; 68(1): 91-96.  3. Mark Anthony et al. Mandibular advancement devices in 620 men and women with PENG and snoring: tolerability and predictors of treatment success. Chest 2004; 125: 8123-7451.  4. José et al. Oral appliances for snoring and PENG: a review. Sleep 2006; 29: 244-262.  5. Luz et al. Oral appliance treatment for PENG: an update. J Clin Sleep Med 2014; 10(2): 215-227.  6. Stacy et al. Predictors of OSAH treatment outcome. J Dent Res 2007; 86: 1632-7980.      Weight Loss:    Weight loss is a long-term strategy that may improve sleep apnea in some patients.    Weight management is a personal decision and the decision should be based on your interest and the potential benefits.  If you are interested in exploring weight loss strategies, the following discussion covers the impact on weight loss on sleep apnea and the approaches that may be successful.    Being overweight does not necessarily mean you will have health consequences.  Those who have BMI over 35 or over 27 with existing medical conditions carries greater risk.   Weight loss decreases severity of sleep apnea in most people with obesity. For those with mild obesity who have developed snoring with weight gain, even 15-30 pound weight loss can improve and occasionally eliminate sleep apnea.  Structured and life-long dietary and health habits are necessary to lose weight and keep healthier weight levels.     Though there may be significant health benefits from weight loss, long-term weight loss is very difficult to achieve- studies show success with dietary management in less than 10% of people. In addition, substantial weight loss may require years of dietary control and may be difficult if patients have severe obesity. In these cases, surgical management may be considered.  Finally, older individuals who have tolerated obesity without health complications may be less likely to benefit from  weight loss strategies.        Your BMI is Body mass index is 29.41 kg/m .  Weight management is a personal decision.  If you are interested in exploring weight loss strategies, the following discussion covers the approaches that may be successful. Body mass index (BMI) is one way to tell whether you are at a healthy weight, overweight, or obese. It measures your weight in relation to your height.  A BMI of 18.5 to 24.9 is in the healthy range. A person with a BMI of 25 to 29.9 is considered overweight, and someone with a BMI of 30 or greater is considered obese. More than two-thirds of American adults are considered overweight or obese.  Being overweight or obese increases the risk for further weight gain. Excess weight may lead to heart disease and diabetes.  Creating and following plans for healthy eating and physical activity may help you improve your health.  Weight control is part of healthy lifestyle and includes exercise, emotional health, and healthy eating habits. Careful eating habits lifelong are the mainstay of weight control. Though there are significant health benefits from weight loss, long-term weight loss with diet alone may be very difficult to achieve- studies show long-term success with dietary management in less than 10% of people. Attaining a healthy weight may be especially difficult to achieve in those with severe obesity. In some cases, medications, devices and surgical management might be considered.  What can you do?  If you are overweight or obese and are interested in methods for weight loss, you should discuss this with your provider.     Consider reducing daily calorie intake by 500 calories.     Keep a food journal.     Avoiding skipping meals, consider cutting portions instead.    Diet combined with exercise helps maintain muscle while optimizing fat loss. Strength training is particularly important for building and maintaining muscle mass. Exercise helps reduce stress, increase  energy, and improves fitness. Increasing exercise without diet control, however, may not burn enough calories to loose weight.       Start walking three days a week 10-20 minutes at a time    Work towards walking thirty minutes five days a week     Eventually, increase the speed of your walking for 1-2 minutes at time    In addition, we recommend that you review healthy lifestyles and methods for weight loss available through the National Institutes of Health patient information sites:  http://win.niddk.nih.gov/publications/index.htm    And look into health and wellness programs that may be available through your health insurance provider, employer, local community center, or frankie club.    Weight management plan: Patient was referred to their PCP to discuss a diet and exercise plan.  Surgery:    Surgery for obstructive sleep apnea is considered generally only when other therapies fail to work. Surgery may be discussed with you if you are having a difficult time tolerating CPAP and or when there is an abnormal structure that requires surgical correction.  Nose and throat surgeries often enlarge the airway to prevent collapse.  Most of these surgeries create pain for 1-2 weeks and up to half of the most common surgeries are not effective throughout life.  You should carefully discuss the benefits and drawbacks to surgery with your sleep provider and surgeon to determine if it is the best solution for you.   More information  Surgery for PENG is directed at areas that are responsible for narrowing or complete obstruction of the airway during sleep.  There are a wide range of procedures available to enlarge and/or stabilize the airway to prevent blockage of breathing in the three major areas where it can occur: the palate, tongue, and nasal regions.  Successful surgical treatment depends on the accurate identification of the factors responsible for obstructive sleep apnea in each person.  A personalized approach is  required because there is no single treatment that works well for everyone.  Because of anatomic variation, consultation with an examination by a sleep surgeon is a critical first step in determining what surgical options are best for each patient.  In some cases, examination during sedation may be recommended in order to guide the selection of procedures.  Patients will be counseled about risks and benefits as well as the typical recovery course after surgery. Surgery is typically not a cure for a person s PENG.  However, surgery will often significantly improve one s PENG severity (termed  success rate ).  Even in the absence of a cure, surgery will decrease the cardiovascular risk associated with OSA7; improve overall quality of life8 (sleepiness, functionality, sleep quality, etc).      Palate Procedures:  Patients with PENG often have narrowing of their airway in the region of their tonsils and uvula.  The goals of palate procedures are to widen the airway in this region as well as to help the tissues resist collapse.  Modern palate procedure techniques focus on tissue conservation and soft tissue rearrangement, rather than tissue removal.  Often the uvula is preserved in this procedure. Residual sleep apnea is common in patient after pharyngoplasty with an average reduction in sleep apnea events of 33%2.      Tongue Procedures:  ExamWhile patients are awake, the muscles that surround the throat are active and keep this region open for breathing. These muscles relax during sleep, allowing the tongue and other structures to collapse and block breathing.  There are several different tongue procedures available.  Selection of a tongue base procedure depends on characteristics seen on physical exam.  Generally, procedures are aimed at removing bulky tissues in this area or preventing the back of the tongue from falling back during sleep.  Success rates for tongue surgery range from 50-62%3.    Hypoglossal Nerve  Stimulation:  Hypoglossal nerve stimulation has recently received approval from the [place] Food and Drug Administration for the treatment of obstructive sleep apnea.  This is based on research showing that the system was safe and effective in treating sleep apnea6.  Results showed that the median AHI score decreased 68%, from 29.3 to 9.0. This therapy uses an implant system that senses breathing patterns and delivers mild stimulation to airway muscles, which keeps the airway open during sleep.  The system consists of three fully implanted components: a small generator (similar in size to a pacemaker), a breathing sensor, and a stimulation lead.  Using a small handheld remote, a patient turns the therapy on before bed and off upon awakening.    Candidates for this device must be greater than 22 years of age, have moderate to severe PENG (AHI between 20-65), BMI less than 32, have tried CPAP/oral appliance without success, and have appropriate upper airway anatomy (determined by a sleep endoscopy performed by Dr. Leiva).    Hypoglossal Nerve Stimulation Pathway:    The sleep surgeon s office will work with the patient through the insurance prior-authorization process (including communications and appeals).    Nasal Procedures:  Nasal obstruction can interfere with nasal breathing during the day and night.  Studies have shown that relief of nasal obstruction can improve the ability of some patients to tolerate positive airway pressure therapy for obstructive sleep apnea1.  Treatment options include medications such as nasal saline, topical corticosteroid and antihistamine sprays, and oral medications such as antihistamines or decongestants. Non-surgical treatments can include external nasal dilators for selected patients. If these are not successful by themselves, surgery can improve the nasal airway either alone or in combination with these other options.  Combination Procedures:  Combination of surgical  procedures and other treatments may be recommended, particularly if patients have more than one area of narrowing or persistent positional disease.  The success rate of combination surgery ranges from 66-80%2,3.    References  1. Kulwant UMANA. The Role of the Nose in Snoring and Obstructive Sleep Apnoea: An Update.  Eur Arch Otorhinolaryngol. 2011; 268: 1365-73.  2.  Kang SM; Freedom JA; Neda JR; Pallanch JF; Rip MB; Jean Marie SG; Natividad MENA. Surgical modifications of the upper airway for obstructive sleep apnea in adults: a systematic review and meta-analysis. SLEEP 2010;33(10):9647-5765. Nellie MEYERS. Hypopharyngeal surgery in obstructive sleep apnea: an evidence-based medicine review.  Arch Otolaryngol Head Neck Surg. 2006 Feb;132(2):206-13.  3. Faizan YH1, Lalo Y, Juan FRANKIE. The efficacy of anatomically based multilevel surgery for obstructive sleep apnea. Otolaryngol Head Neck Surg. 2003 Oct;129(4):327-35.  4. Nellie MEYERS, Goldberg A. Hypopharyngeal Surgery in Obstructive Sleep Apnea: An Evidence-Based Medicine Review. Arch Otolaryngol Head Neck Surg. 2006 Feb;132(2):206-13.  5. Bruce CALLEJAS et al. Upper-Airway Stimulation for Obstructive Sleep Apnea.  N Engl J Med. 2014 Jan 9;370(2):139-49.  6. Vanessa Y et al. Increased Incidence of Cardiovascular Disease in Middle-aged Men with Obstructive Sleep Apnea. Am J Respir Crit Care Med; 2002 166: 159-165  7. Yimi EM et al. Studying Life Effects and Effectiveness of Palatopharyngoplasty (SLEEP) study: Subjective Outcomes of Isolated Uvulopalatopharyngoplasty. Otolaryngol Head Neck Surg. 2011; 144: 623-631.

## 2020-09-14 NOTE — PROGRESS NOTES
"Lakhwinder Solorio is a 50 year old male who is being evaluated via a billable video visit.      The patient has been notified of following:     \"This video visit will be conducted via a call between you and your physician/provider. We have found that certain health care needs can be provided without the need for an in-person physical exam.  This service lets us provide the care you need with a video conversation.  If a prescription is necessary we can send it directly to your pharmacy.  If lab work is needed we can place an order for that and you can then stop by our lab to have the test done at a later time.    Video visits are billed at different rates depending on your insurance coverage.  Please reach out to your insurance provider with any questions.    If during the course of the call the physician/provider feels a video visit is not appropriate, you will not be charged for this service.\"    Patient has given verbal consent for Video visit? Yes  How would you like to obtain your AVS? MyChart  If you are dropped from the video visit, the video invite should be resent to: Send to e-mail at: [email or web address]  Will anyone else be joining your video visit? No        Video-Visit Details    Type of service:  Video Visit    Video Start Time: 10:07 AM  Video End Time: 10:33 AM    Originating Location (pt. Location): Home    Distant Location (provider location):  [facility]     Platform used for Video Visit: Mica Keane MD      Sleep Consultation:    Date on this visit: 9/15/2020    Lakhwinder Solorio is sent by Jerad Villalobos for a sleep consultation regarding possible sleep apnea.    Primary Physician: Salvatore, [facility]     Chief complaint: snoring, witnessed apneas    Presenting history:     Lakhwinder Solorio reports nightly snoring and frequent witnessed apneas, gasping and poor quality of sleep for last 5 years.     Medical history is significant for depression & PTSD.     Lakhwinder does " snore every night. Patient does have a regular bed partner. There is report of snoring, gasping and poor quality of sleep.  He does have witnessed apneas. They never sleep separately.  Patient sleeps on his side. He has frequent morning headaches, denies no restless legs. Lakhwinder denies any bruxism, sleep walking, sleep talking, dream enactment, sleep paralysis, cataplexy and hypnogogic/hypnopompic hallucinations.    Lakhwinder goes to sleep at 12:00 AM during the week. He lies down in bed around 10 pm and reads or watches TV. He wakes up at 6:30 AM without an alarm. He falls asleep in 10 minutes.  Lakhwinder denies difficulty falling asleep.  He wakes up 2-3 times a night for 5 minutes before falling back to sleep.  Lakhwinder wakes up to go to the bathroom and uncertain reasons.  On weekends, Lakhwinder goes to sleep at 12:00 AM.  He wakes up at 6:30 AM without an alarm. He falls asleep in 10 minutes.  Patient gets an average of 6 hours of sleep per night.     Patient does use electronics in bed, watch TV in bed and read in bed.     Lakhwinder has difficulty breathing through his nose.      Patient's [place] Sleepiness score 16/24 consistent with daytime sleepiness.      Lakhwinder naps 0- 1 times per week for 30-60 minutes, feels refreshed after naps. He takes some inadvertant naps.  He denies closing eyes, dozing and falling asleep while driving.  Patient was counseled on the importance of driving while alert, to pull over if drowsy, or nap before getting into the vehicle if sleepy.      He uses 1-2 sodas/day. Last caffeine intake is usually before 2 pm.    Allergies:    No Known Allergies    Medications:    Current Outpatient Medications   Medication Sig Dispense Refill     FLUoxetine (PROZAC) 20 MG capsule        loratadine (CLARITIN) 10 MG tablet Take 10 mg by mouth daily       Multiple Vitamins-Minerals (MULTIVITAMIN PO)        sildenafil (VIAGRA) 50 MG tablet Take 1 tablet (50 mg) by mouth daily as needed (Erectile Dysfunction) Take 30-60  minutes before anticipated sexual activity. 30 tablet 1     ORDER FOR DME, SET TO LOCAL PRINT, Equipment being ordered: Compression stockings 15-20 mmHg (Patient not taking: Reported on 3/4/2020) 1 each 1       Problem List:  Patient Active Problem List    Diagnosis Date Noted     Suicidal ideation 03/04/2020     Priority: Medium     Moderate episode of recurrent major depressive disorder (H) 03/04/2020     Priority: Medium     PTSD (post-traumatic stress disorder) 03/04/2020     Priority: Medium     Allergic rhinitis 05/26/2015     Priority: Medium     Mild persistent asthma 05/26/2015     Priority: Medium     Irritable bowel syndrome 05/26/2015     Priority: Medium     History of colonic polyps 05/26/2015     Priority: Medium     Varicose veins of legs 05/26/2015     Priority: Medium     Snoring 05/26/2015     Priority: Medium     Palpitations 05/26/2015     Priority: Medium     Obesity 05/26/2015     Priority: Medium     CARDIOVASCULAR SCREENING; LDL GOAL LESS THAN 130 11/22/2011     Priority: Medium        Past Medical/Surgical History:  Past Medical History:   Diagnosis Date     Mild persistent asthma 5/26/2015     Past Surgical History:   Procedure Laterality Date     COLONOSCOPY  2003, 2008    IBS, colon polyps     VASECTOMY  2007       Social History:  Social History     Socioeconomic History     Marital status:      Spouse name: Not on file     Number of children: Not on file     Years of education: Not on file     Highest education level: Not on file   Occupational History     Not on file   Social Needs     Financial resource strain: Not on file     Food insecurity     Worry: Not on file     Inability: Not on file     Transportation needs     Medical: Not on file     Non-medical: Not on file   Tobacco Use     Smoking status: Never Smoker     Smokeless tobacco: Never Used   Substance and Sexual Activity     Alcohol use: Yes     Comment: social     Drug use: No     Sexual activity: Yes     Partners:  Female   Lifestyle     Physical activity     Days per week: Not on file     Minutes per session: Not on file     Stress: Not on file   Relationships     Social connections     Talks on phone: Not on file     Gets together: Not on file     Attends Scientologist service: Not on file     Active member of club or organization: Not on file     Attends meetings of clubs or organizations: Not on file     Relationship status: Not on file     Intimate partner violence     Fear of current or ex partner: Not on file     Emotionally abused: Not on file     Physically abused: Not on file     Forced sexual activity: Not on file   Other Topics Concern     Parent/sibling w/ CABG, MI or angioplasty before 65F 55M? Not Asked   Social History Narrative     Not on file       Family History:  Family History   Problem Relation Age of Onset     Heart Disease Mother         ?Weak heart and irregular heart beat     Alcohol/Drug Mother      Hypertension Mother      Heart Disease Father 54        Multiple MIs     Allergies Father      Alzheimer Disease Father      Asthma Father      Alzheimer Disease Paternal Grandmother      Alcohol/Drug Maternal Grandmother      Alcohol/Drug Maternal Grandfather      Hypertension Maternal Grandfather      Colon Cancer No family hx of      Prostate Cancer No family hx of        Review of Systems:  A complete review of systems reviewed by me is negative with the exeption of what has been mentioned in the history of present illness.  CONSTITUTIONAL: NEGATIVE for weight gain/loss, fever, chills, sweats or night sweats, drug allergies.  EYES: NEGATIVE for changes in vision, blind spots, double vision.  ENT:  POSITIVE for  sinus pain  CARDIAC: NEGATIVE for fast heartbeats or fluttering in chest, chest pain or pressure, breathlessness when lying flat, swollen legs or swollen feet.  NEUROLOGIC: NEGATIVE headaches, weakness or numbness in the arms or legs.  DERMATOLOGIC: NEGATIVE for rashes, new moles or change in  "mole(s)  PULMONARY: NEGATIVE SOB at rest, SOB with activity, dry cough, productive cough, coughing up blood, wheezing or whistling when breathing.    GASTROINTESTINAL: NEGATIVE for nausea or vomitting, loose or watery stools, fat or grease in stools, constipation, abdominal pain, bowel movements black in color or blood noted.  GENITOURINARY: NEGATIVE for pain during urination, blood in urine, urinating more frequently than usual, irregular menstrual periods.  MUSCULOSKELETAL: NEGATIVE for muscle pain, bone or joint pain, swollen joints.  ENDOCRINE: NEGATIVE for increased thirst or urination, diabetes.  LYMPHATIC: NEGATIVE for swollen lymph nodes, lumps or bumps in the breasts or nipple discharge.    Physical Examination:  Vitals: Ht 1.778 m (5' 10\")   Wt 93 kg (205 lb)   BMI 29.41 kg/m    BMI= Body mass index is 29.41 kg/m .         Franklin Total Score 9/14/2020   Total score - Franklin 16       VILMA Total Score: 21 (09/14/20 1148)    GENERAL APPEARANCE: healthy, alert, active and no distress  EYES: Eyes grossly normal to inspection  HENT: No external lesions  RESP: Normal respiratory rate and rhythm   MS: extremities normal- no gross deformities noted  SKIN: no suspicious lesions or rashes  NEURO: mentation intact and speech normal  PSYCH: mentation appears normal and affect normal/bright    Impression/Plan:    1. Probable Obstructive sleep apnea  2. Excessive daytime sleepiness    - Patient is a 50 years old male, BMI 29, who presents with history of snoring, witnessed apneas and daytime sleepiness. There is a high risk for sleep apnea and an overnight sleep study is recommended for further assessment.     Plan:     1. PSG for assessment of sleep apnea       Literature provided regarding sleep apnea.      He will follow up with me in approximately two weeks after his sleep study has been competed to review the results and discuss plan of care.       Polysomnography reviewed.  Obstructive sleep apnea " reviewed.  Complications of untreated sleep apnea were reviewed.    I spent a total of 40 minutes for this appointment today which include time spent before, during and after the visit for patient care, counseling and coordination of care.    Dr. Lazaro Keane     CC: Jerda Villalobos

## 2020-09-15 ENCOUNTER — VIRTUAL VISIT (OUTPATIENT)
Dept: SLEEP MEDICINE | Facility: CLINIC | Age: 50
End: 2020-09-15
Attending: NURSE PRACTITIONER
Payer: COMMERCIAL

## 2020-09-15 DIAGNOSIS — R40.0 SLEEPINESS: ICD-10-CM

## 2020-09-15 DIAGNOSIS — R06.81 WITNESSED APNEIC SPELLS: ICD-10-CM

## 2020-09-15 DIAGNOSIS — R06.83 SNORING: ICD-10-CM

## 2020-09-15 DIAGNOSIS — R29.818 SUSPECTED SLEEP APNEA: Primary | ICD-10-CM

## 2020-09-15 PROCEDURE — 99204 OFFICE O/P NEW MOD 45 MIN: CPT | Mod: 95 | Performed by: INTERNAL MEDICINE

## 2020-09-15 RX ORDER — ZOLPIDEM TARTRATE 5 MG/1
TABLET ORAL
Qty: 1 TABLET | Refills: 0 | Status: SHIPPED | OUTPATIENT
Start: 2020-09-15 | End: 2020-11-03

## 2020-09-23 ENCOUNTER — TELEPHONE (OUTPATIENT)
Dept: SLEEP MEDICINE | Facility: CLINIC | Age: 50
End: 2020-09-23

## 2020-09-25 ENCOUNTER — ANCILLARY PROCEDURE (OUTPATIENT)
Dept: ULTRASOUND IMAGING | Facility: CLINIC | Age: 50
End: 2020-09-25
Attending: SURGERY
Payer: COMMERCIAL

## 2020-09-25 ENCOUNTER — OFFICE VISIT (OUTPATIENT)
Dept: VASCULAR SURGERY | Facility: CLINIC | Age: 50
End: 2020-09-25
Attending: SURGERY
Payer: COMMERCIAL

## 2020-09-25 DIAGNOSIS — I83.813 VARICOSE VEINS OF BOTH LOWER EXTREMITIES WITH PAIN: Primary | ICD-10-CM

## 2020-09-25 DIAGNOSIS — Z11.59 ENCOUNTER FOR SCREENING FOR OTHER VIRAL DISEASES: ICD-10-CM

## 2020-09-25 DIAGNOSIS — I83.893 VARICOSE VEINS OF LEG WITH EDEMA, BILATERAL: ICD-10-CM

## 2020-09-25 DIAGNOSIS — I83.813 VARICOSE VEINS OF BOTH LOWER EXTREMITIES WITH PAIN: ICD-10-CM

## 2020-09-25 PROCEDURE — 99213 OFFICE O/P EST LOW 20 MIN: CPT | Performed by: SURGERY

## 2020-09-25 PROCEDURE — 93970 EXTREMITY STUDY: CPT | Performed by: SURGERY

## 2020-09-25 PROCEDURE — U0003 INFECTIOUS AGENT DETECTION BY NUCLEIC ACID (DNA OR RNA); SEVERE ACUTE RESPIRATORY SYNDROME CORONAVIRUS 2 (SARS-COV-2) (CORONAVIRUS DISEASE [COVID-19]), AMPLIFIED PROBE TECHNIQUE, MAKING USE OF HIGH THROUGHPUT TECHNOLOGIES AS DESCRIBED BY CMS-2020-01-R: HCPCS | Performed by: SPECIALIST

## 2020-09-25 NOTE — PROGRESS NOTES
[facility] Note    Lakhwinder Solorio presents in follow-up of right greater than left lower extremity pain, swelling and varicose veins.  He has a stasis dermatitis the right leg with occasional breakdown to small ulcers on multiple occasions.  This is occurred despite the use of compression hose, leg elevation, dietary measures and exercise for more than 6 months.  The compression hose elastic band has rubbed the varicosities below his knee increasing his discomfort and make it difficult for him to get this to heal.  He returned today for bilateral lower extremity venous competency study.    Physical Exam  General: Pleasant male in no acute distress.  Blood pressure 131/92, pulse 88  Extremities: Right lower extremity: 8 to 10 mm varicosities coursing from the knee crease down to his ankle and proximal foot over the medial and anteromedial right leg.  There are several punctate eschars over the course of the medial leg.  There is 2+ edema of his right ankle with intense corona phlebectatica of his right medial ankle.    He has 3+ dorsalis pedis and posterior tibial pulses bilaterally.    Ultrasound:  Right lower extremity: No evidence of deep vein thrombosis.  His right common femoral vein is incompetent but there is remaining deep vein valves are competent.  His right great saphenous vein is incompetent from the saphenofemoral junction essentially to the ankle with the exception of a short segment in the mid calf, measures 17 mm in diameter with reflux time of 4.1 seconds.  There is a source of multiple incompetent vein branches measuring up to 8.9 mm in diameter with reflux time of 3.2 seconds.  These coursed from the biceps vein at the knee level and distally.    The right small saphenous vein, anterior sensory saphenous vein are competent.  The Vein of Giacomini is not visualized.    Left lower extremity: No evidence of deep vein thrombosis or deep vein valve incompetence.    His left great saphenous vein  is incompetent from the saphenofemoral junction to the knee, measures 9.4 mm in diameter with reflux time of 2.8 seconds.  Is the source of multiple incompetent vein branches measuring up to 5 mm in diameter with reflux time of 2.4 seconds.    The left small saphenous vein is competent.    The left Vein of Giacomini is incompetent but quite small.    The left anterior accessory saphenous vein is incompetent, has a short stretch segment but then communicates with the great saphenous vein in the proximal thigh.    There are no significant incompetent perforators appreciated.    Assessment:  CEAP 4 right lower extremity and CEAP 3 left lower extremity chronic venous insufficiency secondary to bilateral great saphenous vein incompetence.  The patient has had a stasis dermatitis of his right leg and has developed a scar from the elastic band on his knee-high compression negative difficult for him to continue to wear his compression.    I do not feel that conservative management is a good option for him and would recommend radiofrequency ablation of his right great saphenous vein with phlebectomies.  Details of procedure including risks of bleeding, infection, nerve injury, scarring, hyperpigmentation, deep vein thrombosis, recanalization of the great saphenous vein and recurrent varicose veins were discussed.  He also understands that swelling of the right ankle may not completely resolve.    His left lower extremity is symptomatic but less so than the right.  We will plan to treat the right leg initially and consider treatment of the left great saphenous vein in the future.    Plan:  Radiofrequency ablation right great saphenous vein with phlebectomies    Alfonso Adkins MD    Dictated using Dragon voice recognition software which may result in transcription errors          [facility] NEW PATIENT:

## 2020-09-25 NOTE — PROGRESS NOTES
"Date measured: 9/25/20  Measured by: TS    Measurements:  Right Ankle: 27cm  Right Calf: 40.5cm  Right Thigh: 57cm    Left Ankle: 23.5cm  Left Calf: 39cm  Left Thigh: 57cm    Leg Length: 70cm  Calf Length: 44cm  Height: 5' 10\"  Shoe Size: 10.5    Prescription Details:  Style ([place] one): Knee High -   Thigh High -   Pantyhose -     Patient Preferences:  Toe: ____________ Color: _____________    Length of Need: Life Time    Estuardo Valentin RN  "

## 2020-09-25 NOTE — LETTER
"    9/25/2020         RE: Lakhwinder Solorio  [id number]  [place] [address]        Dear Colleague,    Thank you for referring your patient, Lakhwinder Solorio, [facility]. Please see a copy of my visit note below.    Date measured: 9/25/20  Measured by: TS    Measurements:  Right Ankle: 27cm  Right Calf: 40.5cm  Right Thigh: 57cm    Left Ankle: 23.5cm  Left Calf: 39cm  Left Thigh: 57cm    Leg Length: 70cm  Calf Length: 44cm  Height: 5' 10\"  Shoe Size: 10.5    Prescription Details:  Style ([place] one): Knee High -   Thigh High -   Pantyhose -     Patient Preferences:  Toe: ____________ Color: _____________    Length of Need: Life Time    Estuardo Valentin RN    [facility]    Lakhwinder Solorio presents in follow-up of *** lower extremity ***.  He returns today for venous duplex ultrasound of his lower extremity veins. ***    Physical Exam  General: Pleasant male in no acute distress.  Blood pressure ***, pulse ***  Extremities: ***    Ultrasound:  ***    Assessment:  ***    Plan:  ***    Alfonso Adkins MD            [facility]    Lakhwinder Solorio presents in follow-up of right greater than left lower extremity pain, swelling and varicose veins.  He has a stasis dermatitis the right leg with occasional breakdown to small ulcers on multiple occasions.  This is occurred despite the use of compression hose, leg elevation, dietary measures and exercise for more than 6 months.  The compression hose elastic band has rubbed the varicosities below his knee increasing his discomfort and make it difficult for him to get this to heal.  He returned today for bilateral lower extremity venous competency study.    Physical Exam  General: Pleasant male in no acute distress.  Blood pressure 131/92, pulse 88  Extremities: Right lower extremity: 8 to 10 mm varicosities coursing from the knee crease down to his ankle and proximal foot over the " medial and anteromedial right leg.  There are several punctate eschars over the course of the medial leg.  There is 2+ edema of his right ankle with intense corona phlebectatica of his right medial ankle.    He has 3+ dorsalis pedis and posterior tibial pulses bilaterally.    Ultrasound:  Right lower extremity: No evidence of deep vein thrombosis.  His right common femoral vein is incompetent but there is remaining deep vein valves are competent.  His right great saphenous vein is incompetent from the saphenofemoral junction essentially to the ankle with the exception of a short segment in the mid calf, measures 17 mm in diameter with reflux time of 4.1 seconds.  There is a source of multiple incompetent vein branches measuring up to 8.9 mm in diameter with reflux time of 3.2 seconds.  These coursed from the biceps vein at the knee level and distally.    The right small saphenous vein, anterior sensory saphenous vein are competent.  The Vein of Giacomini is not visualized.    Left lower extremity: No evidence of deep vein thrombosis or deep vein valve incompetence.    His left great saphenous vein is incompetent from the saphenofemoral junction to the knee, measures 9.4 mm in diameter with reflux time of 2.8 seconds.  Is the source of multiple incompetent vein branches measuring up to 5 mm in diameter with reflux time of 2.4 seconds.    The left small saphenous vein is competent.    The left Vein of Giacomini is incompetent but quite small.    The left anterior accessory saphenous vein is incompetent, has a short stretch segment but then communicates with the great saphenous vein in the proximal thigh.    There are no significant incompetent perforators appreciated.    Assessment:  CEAP 4 right lower extremity and CEAP 3 left lower extremity chronic venous insufficiency secondary to bilateral great saphenous vein incompetence.  The patient has had a stasis dermatitis of his right leg and has developed a scar from  the elastic band on his knee-high compression negative difficult for him to continue to wear his compression.    I do not feel that conservative management is a good option for him and would recommend radiofrequency ablation of his right great saphenous vein with phlebectomies.  Details of procedure including risks of bleeding, infection, nerve injury, scarring, hyperpigmentation, deep vein thrombosis, recanalization of the great saphenous vein and recurrent varicose veins were discussed.  He also understands that swelling of the right ankle may not completely resolve.    His left lower extremity is symptomatic but less so than the right.  We will plan to treat the right leg initially and consider treatment of the left great saphenous vein in the future.    Plan:  Radiofrequency ablation right great saphenous vein with phlebectomies    Alfonso Adkins MD    Dictated using Dragon voice recognition software which may result in transcription errors          [facility] NEW PATIENT:                Again, thank you for allowing me to participate in the care of your patient.        Sincerely,        Alfonso Adkins MD

## 2020-09-25 NOTE — Clinical Note
1.  Radiofrequency ablation right great saphenous vein with greater than 20 medically necessary [id number]-5-1/2 hours for procedure  2.  (At a later date) radiofrequency ablation left great saphenous vein with 10-20 medic necessary phlebectomies.

## 2020-09-26 LAB
SARS-COV-2 RNA SPEC QL NAA+PROBE: NOT DETECTED
SPECIMEN SOURCE: NORMAL

## 2020-09-29 ENCOUNTER — HOSPITAL ENCOUNTER (OUTPATIENT)
Facility: CLINIC | Age: 50
Discharge: HOME | End: 2020-09-29
Attending: SPECIALIST | Admitting: SPECIALIST
Payer: COMMERCIAL

## 2020-09-29 VITALS
DIASTOLIC BLOOD PRESSURE: 94 MMHG | SYSTOLIC BLOOD PRESSURE: 129 MMHG | RESPIRATION RATE: 13 BRPM | OXYGEN SATURATION: 92 % | HEART RATE: 74 BPM

## 2020-09-29 LAB — COLONOSCOPY: NORMAL

## 2020-09-29 PROCEDURE — 45378 DIAGNOSTIC COLONOSCOPY: CPT | Performed by: SPECIALIST

## 2020-09-29 PROCEDURE — G0121 COLON CA SCRN NOT HI RSK IND: HCPCS | Performed by: SPECIALIST

## 2020-09-29 PROCEDURE — G0500 MOD SEDAT ENDO SERVICE >5YRS: HCPCS | Performed by: SPECIALIST

## 2020-09-29 PROCEDURE — 25000128 H RX IP 250 OP 636: Performed by: SPECIALIST

## 2020-09-29 RX ORDER — LIDOCAINE 40 MG/G
CREAM TOPICAL
Status: DISCONTINUED | OUTPATIENT
Start: 2020-09-29 | End: 2020-09-29 | Stop reason: HOSPADM

## 2020-09-29 RX ORDER — FENTANYL CITRATE 50 UG/ML
INJECTION, SOLUTION INTRAMUSCULAR; INTRAVENOUS PRN
Status: DISCONTINUED | OUTPATIENT
Start: 2020-09-29 | End: 2020-09-29 | Stop reason: HOSPADM

## 2020-09-29 RX ORDER — ONDANSETRON 2 MG/ML
4 INJECTION INTRAMUSCULAR; INTRAVENOUS
Status: DISCONTINUED | OUTPATIENT
Start: 2020-09-29 | End: 2020-09-29 | Stop reason: HOSPADM

## 2020-09-29 NOTE — H&P
Pre-Endoscopy History and Physical     Lakhwinder Solorio MRN# [id number]   YOB: 1970 Age: 50 year old     Date of Procedure: 9/29/2020  Primary care provider: [facility] [place]  Type of Endoscopy: Colonoscopy with possible biopsy, possible polypectomy  Reason for Procedure: history of polyps  Type of Anesthesia Anticipated: Conscious Sedation    HPI:    Lakhwinder is a 50 year old male who will be undergoing the above procedure.      A history and physical has been performed. The patient's medications and allergies have been reviewed. The risks and benefits of the procedure and the sedation options and risks were discussed with the patient.  All questions were answered and informed consent was obtained.      He denies a personal or family history of anesthesia complications or bleeding disorders.     Patient Active Problem List   Diagnosis     CARDIOVASCULAR SCREENING; LDL GOAL LESS THAN 130     Allergic rhinitis     Mild persistent asthma     Irritable bowel syndrome     History of colonic polyps     Varicose veins of legs     Snoring     Palpitations     Obesity     Suicidal ideation     Moderate episode of recurrent major depressive disorder (H)     PTSD (post-traumatic stress disorder)        Past Medical History:   Diagnosis Date     Depressive disorder      IBS (irritable bowel syndrome)      Mild persistent asthma 5/26/2015        Past Surgical History:   Procedure Laterality Date     COLONOSCOPY  2003, 2008    IBS, colon polyps     VASECTOMY  2007       Social History     Tobacco Use     Smoking status: Never Smoker     Smokeless tobacco: Never Used   Substance Use Topics     Alcohol use: Yes     Comment: social       Family History   Problem Relation Age of Onset     Heart Disease Mother         ?Weak heart and irregular heart beat     Alcohol/Drug Mother      Hypertension Mother      Heart Disease Father 54        Multiple MIs     Allergies Father      Alzheimer Disease Father      Asthma  "Father      Alzheimer Disease Paternal Grandmother      Alcohol/Drug Maternal Grandmother      Alcohol/Drug Maternal Grandfather      Hypertension Maternal Grandfather      Colon Cancer No family hx of      Prostate Cancer No family hx of        Prior to Admission medications    Medication Sig Start Date End Date Taking? Authorizing Provider   sildenafil (VIAGRA) 50 MG tablet Take 1 tablet (50 mg) by mouth daily as needed (Erectile Dysfunction) Take 30-60 minutes before anticipated sexual activity. 3/5/20  Yes Jerad Villalobos, NP   FLUoxetine (PROZAC) 20 MG capsule  1/23/20   Reported, Patient   loratadine (CLARITIN) 10 MG tablet Take 10 mg by mouth daily    Reported, Patient   Multiple Vitamins-Minerals (MULTIVITAMIN PO)     Reported, Patient   ORDER FOR DME, SET TO LOCAL PRINT, Equipment being ordered: Compression stockings 15-20 mmHg 5/26/15   Dunia Cruz PA-C   zolpidem (AMBIEN) 5 MG tablet Take tablet by mouth 15 minutes prior to sleep, for Sleep Study 9/15/20   Lazaro Keane MD       No Known Allergies     REVIEW OF SYSTEMS:   5 point ROS negative except as noted above in HPI, including Gen., Resp., CV, GI &  system review.    PHYSICAL EXAM:   BP (!) 141/91   Resp 16   SpO2 96%  Estimated body mass index is 29.41 kg/m  as calculated from the following:    Height as of 9/14/20: 1.778 m (5' 10\").    Weight as of 9/14/20: 93 kg (205 lb).   GENERAL APPEARANCE: alert, and oriented  MENTAL STATUS: alert  AIRWAY EXAM: Mallampatti Class I (visualization of the soft palate, fauces, uvula, anterior and posterior pillars)  RESP: lungs clear to auscultation - no rales, rhonchi or wheezes  CV: regular rates and rhythm  DIAGNOSTICS:    Not indicated    IMPRESSION   ASA Class 2 - Mild systemic disease    PLAN:   Plan for Colonoscopy with possible biopsy, possible polypectomy. We discussed the risks, benefits and alternatives and the patient wished to proceed.    The above has been forwarded to the " consulting provider.      Signed Electronically by: Ismael Lester MD  September 29, 2020

## 2020-09-30 ENCOUNTER — TELEPHONE (OUTPATIENT)
Dept: FAMILY MEDICINE | Facility: CLINIC | Age: 50
End: 2020-09-30

## 2020-09-30 NOTE — TELEPHONE ENCOUNTER
Pt is due now to update PHQ9.  [employer] message sent to pt. Follow up end date 11/3/20.   PHQ-9 SCORE 3/4/2020   PHQ-9 Total Score 11     Jayy FERNANDES CMA

## 2020-10-06 NOTE — TELEPHONE ENCOUNTER
S/w pt who states he does not have time to go over PHQ 9 and will do it online.    Jazz CUMMINS RN  EP Triage

## 2020-10-06 NOTE — TELEPHONE ENCOUNTER
Pt has not responded to [employer] message, please call pt and update.Jayy FERNANDES, DAVID

## 2020-10-07 NOTE — TELEPHONE ENCOUNTER
Patient has read my chart message but has not completed phq9.  Will flag for tomorrow.    COLLIN TrivediN, RN  [employer] Triage

## 2020-10-08 ASSESSMENT — PATIENT HEALTH QUESTIONNAIRE - PHQ9: SUM OF ALL RESPONSES TO PHQ QUESTIONS 1-9: 10

## 2020-10-08 NOTE — TELEPHONE ENCOUNTER
"PHQ 3/4/2020   PHQ-9 Total Score 11   Q9: Thoughts of better off dead/self-harm past 2 weeks Several days     Patient was going to [facility] but has not been going due to cost but found it beneficial.  Patient states he feels like the medication is working and he does have a support system.  Patient states months ago he did have a plan in place but does not currently and feels like he just \"gets down and the PTSD I was diagnosed with just gets a hold of me sometimes\".  Patient agreed to a contract for safety and states he will go to the nearest ER and patient agreed.    Routing to provider for review and advise as appropriate.    COLLIN TrivediN, RN  [employer] Triage        "

## 2020-10-23 DIAGNOSIS — I83.813 VARICOSE VEINS OF BOTH LOWER EXTREMITIES WITH PAIN: Primary | ICD-10-CM

## 2020-10-28 ENCOUNTER — THERAPY VISIT (OUTPATIENT)
Dept: SLEEP MEDICINE | Facility: CLINIC | Age: 50
End: 2020-10-28
Payer: COMMERCIAL

## 2020-10-28 DIAGNOSIS — G47.33 OSA (OBSTRUCTIVE SLEEP APNEA): ICD-10-CM

## 2020-10-28 DIAGNOSIS — I83.813 VARICOSE VEINS OF BOTH LOWER EXTREMITIES WITH PAIN: Primary | ICD-10-CM

## 2020-10-28 DIAGNOSIS — R06.81 WITNESSED APNEIC SPELLS: ICD-10-CM

## 2020-10-28 DIAGNOSIS — R40.0 SLEEPINESS: ICD-10-CM

## 2020-10-28 DIAGNOSIS — R06.83 SNORING: ICD-10-CM

## 2020-10-28 DIAGNOSIS — R29.818 SUSPECTED SLEEP APNEA: ICD-10-CM

## 2020-10-28 PROCEDURE — 95810 POLYSOM 6/> YRS 4/> PARAM: CPT | Performed by: INTERNAL MEDICINE

## 2020-10-30 ENCOUNTER — TELEPHONE (OUTPATIENT)
Dept: VASCULAR SURGERY | Facility: CLINIC | Age: 50
End: 2020-10-30

## 2020-10-30 DIAGNOSIS — I83.813 VARICOSE VEINS OF BOTH LOWER EXTREMITIES WITH PAIN: Primary | ICD-10-CM

## 2020-11-01 PROBLEM — G47.33 OSA (OBSTRUCTIVE SLEEP APNEA): Status: ACTIVE | Noted: 2020-11-01

## 2020-11-01 NOTE — PROCEDURES
"SLEEP STUDY INTERPRETATION  DIAGNOSTIC POLYSOMNOGRAPHY REPORT      Patient: LISA LEWIS  YOB: 1970  Study Date: 10/28/2020  MRN: [id number]  Referring Provider: Jerad Villalobos NP  Ordering Provider: Lazaro Keane MD    Indications for Polysomnography: The patient is a 50 year old Male who is 5' 10\" and weighs 205.0 lbs. His BMI is 29.7, [place] sleepiness scale 16 and neck circumference is 41 cm. Relevant medical history includes depression. A diagnostic polysomnogram was performed to evaluate for sleep apnea.    Polysomnogram Data: A full night polysomnogram recorded the standard physiologic parameters including EEG, EOG, EMG, ECG, nasal and oral airflow. Respiratory parameters of chest and abdominal movements were recorded with respiratory inductance plethysmography. Oxygen saturation was recorded by pulse oximetry. Hypopnea scoring rule used: 1B 4%.    Sleep Architecture: Sleep was fragmented.   The total recording time of the polysomnogram was 487.0 minutes. The total sleep time was 445.0 minutes. Sleep latency was decreased at 1.0 minutes with the use of a sleep aid (Zolpidem 5 mg). REM latency was 144.0 minutes. Arousal index was increased at 35.2 arousals per hour. Sleep efficiency was normal at 91.4%. Wake after sleep onset was 40.5 minutes. The patient spent 7.9% of total sleep time in Stage N1, 60.4% in Stage N2, 12.4% in Stage N3, and 19.3% in REM. Time in REM supine was 20.0 minutes.    Respiration: Severe obstructive sleep apnea and associated oxygen desaturations.     Events ? The polysomnogram revealed a presence of 131 obstructive, 24 central, and 20 mixed apneas resulting in an apnea index of 23.6 events per hour. There were 91 obstructive hypopneas and - central hypopneas resulting in an obstructive hypopnea index of 12.3 and central hypopnea index of - events per hour. The combined apnea/hypopnea index was 35.9 events per hour (central apnea/hypopnea index was 3.2 events per hour). " The REM AHI was 26.5 events per hour. The supine AHI was 82.1 events per hour. The RERA index was 2.3 events per hour.  The RDI was 38.2 events per hour.    Snoring - was reported as moderate to loud.    Respiratory rate and pattern - was notable for normal respiratory rate and pattern.    Sustained Sleep Associated Hypoventilation - Transcutaneous carbon dioxide monitoring was not used, however significant hypoventilation was not suggested by oximetry.    Sleep Associated Hypoxemia - (Greater than 5 minutes O2 sat at or below 88%) was present. Baseline oxygen saturation was 91.9%. Lowest oxygen saturation was 73.2%. Time spent less than or equal to 88% was 44.3 minutes. Time spent less than or equal to 89% was 62.3 minutes.    Movement Activity: There was no significant movement abnormality.     Periodic Limb Activity - There were - PLMs during the entire study. The PLM index was - movements per hour. The PLM Arousal Index was - per hour.    REM EMG Activity - Excessive transient/sustained muscle activity was not present.    Nocturnal Behavior - Abnormal sleep related behaviors were not noted during/arising out of NREM / REM sleep.    Bruxism - None apparent.    Cardiac Summary: Sinus rhythm.   The average pulse rate was 67.2 bpm. The minimum pulse rate was 42.0 bpm while the maximum pulse rate was 97.5 bpm.  Arrhythmias were not noted.    Assessment:     Severe obstructive sleep apnea with associated oxygen desaturations and sleep fragmentation. Respiratory events were exacerbated in supine sleep.     Recommendations:    Recommend treatment with Auto?titrating PAP therapy with a range of 5 to 15 cmH2O. Recommend clinical follow up with sleep management team.    If CPAP is not tolerated, patient may be a candidate for dental appliance through referral to Sleep Dentistry. Dental appliance therapy will be most effective when combined with positional therapy to avoid supine sleep.     If devices are not acceptable or  effective, patient may benefit from evaluation of possible surgical options. If he is interested, would recommend referral to specialized ENT-Sleep provider.    Weight management.    Diagnostic Codes:   Obstructive Sleep Apnea G47.33  Sleep Hypoxemia G47.36   Repetitive Intrusions Into Sleep F51.8    10/28/2020 [facility] Study (205.0 lbs) - AHI 35.9, RDI 38.2, Supine AHI 82.1, REM AHI 26.5, Low O2 73.2%, Time Spent ?88% 44.3 minutes / Time Spent ?89% 62.3 minutes.     _____________________________________   Electronically Signed By: Lazaro Keane MD 11/01/2020

## 2020-11-02 LAB — SLPCOMP: NORMAL

## 2020-11-03 ENCOUNTER — VIRTUAL VISIT (OUTPATIENT)
Dept: SLEEP MEDICINE | Facility: CLINIC | Age: 50
End: 2020-11-03
Payer: COMMERCIAL

## 2020-11-03 VITALS — BODY MASS INDEX: 29.2 KG/M2 | WEIGHT: 204 LBS | HEIGHT: 70 IN

## 2020-11-03 DIAGNOSIS — G47.33 OSA (OBSTRUCTIVE SLEEP APNEA): Primary | ICD-10-CM

## 2020-11-03 PROCEDURE — 99213 OFFICE O/P EST LOW 20 MIN: CPT | Mod: 95 | Performed by: INTERNAL MEDICINE

## 2020-11-03 RX ORDER — CLONIDINE HYDROCHLORIDE 0.1 MG/1
TABLET ORAL
Qty: 2 TABLET | Refills: 0 | Status: SHIPPED | OUTPATIENT
Start: 2020-11-03 | End: 2021-01-26

## 2020-11-03 RX ORDER — LORAZEPAM 1 MG/1
TABLET ORAL
Qty: 6 TABLET | Refills: 0 | Status: SHIPPED | OUTPATIENT
Start: 2020-11-03 | End: 2021-01-26

## 2020-11-03 ASSESSMENT — MIFFLIN-ST. JEOR: SCORE: 1791.59

## 2020-11-03 NOTE — PROGRESS NOTES
"Lakhwinder Solorio is a 50 year old male who is being evaluated via a billable video visit.      The patient has been notified of following:     \"This video visit will be conducted via a call between you and your physician/provider. We have found that certain health care needs can be provided without the need for an in-person physical exam.  This service lets us provide the care you need with a video conversation.  If a prescription is necessary we can send it directly to your pharmacy.  If lab work is needed we can place an order for that and you can then stop by our lab to have the test done at a later time.    Video visits are billed at different rates depending on your insurance coverage.  Please reach out to your insurance provider with any questions.    If during the course of the call the physician/provider feels a video visit is not appropriate, you will not be charged for this service.\"    Patient has given verbal consent for Video visit? Yes  How would you like to obtain your AVS? MyChart  If you are dropped from the video visit, the video invite should be resent to: Text to cell phone: [phone]  Will anyone else be joining your video visit? No        Video-Visit Details    Type of service:  Video Visit    Video Start Time: 11:37 AM  Video End Time: 11:46 AM    Originating Location (pt. Location): Home    Distant Location (provider location):  [facility]     Platform used for Video Visit: [email or web address]    Lazaro Keane MD      Sleep Study Follow-Up Visit:    Date on this visit: 11/3/2020    Lakhwinder Solorio comes in today for follow-up of his sleep study done on 10/28/2020 at the [facility] for possible sleep apnea.    Sleep latency 1 minutes with Ambien.  REM achieved.   REM latency 144 minutes.  Sleep efficiency 91.4%. Total sleep time 445 minutes.    Sleep architecture:  Stage 1, 7.9% (5%), stage 2, 60.4% (45-55%), stage 3, 12.4% (15-20%), stage REM, 19% (20-25%).  AHI was " 35.9, with desaturations. RDI 38.  REM AHI 26.5, consistent with moderate REM PENG.  Supine AHI 82, consistent with severe SUPINE PENG.  Periodic Limb Movement Index -/hour.       These findings were reviewed with patient.     Past medical/surgical history, family history, social history, medications and allergies were reviewed.      Past Medical History:   Diagnosis Date     Depressive disorder      IBS (irritable bowel syndrome)      Mild persistent asthma 5/26/2015     Family History   Problem Relation Age of Onset     Heart Disease Mother         ?Weak heart and irregular heart beat     Alcohol/Drug Mother      Hypertension Mother      Heart Disease Father 54        Multiple MIs     Allergies Father      Alzheimer Disease Father      Asthma Father      Alzheimer Disease Paternal Grandmother      Alcohol/Drug Maternal Grandmother      Alcohol/Drug Maternal Grandfather      Hypertension Maternal Grandfather      Colon Cancer No family hx of      Prostate Cancer No family hx of      Social History     Tobacco Use     Smoking status: Never Smoker     Smokeless tobacco: Never Used   Substance Use Topics     Alcohol use: Yes     Comment: social     Drug use: No       Problem List:  Patient Active Problem List    Diagnosis Date Noted     PENG (obstructive sleep apnea) 11/01/2020     Priority: Medium     10/28/2020 [facility] Study (205.0 lbs) - AHI 35.9, RDI 38.2, Supine AHI 82.1, REM AHI 26.5, Low O2 73.2%, Time Spent ?88% 44.3 minutes / Time Spent ?89% 62.3 minutes.       Suicidal ideation 03/04/2020     Priority: Medium     Moderate episode of recurrent major depressive disorder (H) 03/04/2020     Priority: Medium     PTSD (post-traumatic stress disorder) 03/04/2020     Priority: Medium     Allergic rhinitis 05/26/2015     Priority: Medium     Mild persistent asthma 05/26/2015     Priority: Medium     Irritable bowel syndrome 05/26/2015     Priority: Medium     History of colonic polyps 05/26/2015      Priority: Medium     Varicose veins of legs 05/26/2015     Priority: Medium     Snoring 05/26/2015     Priority: Medium     Palpitations 05/26/2015     Priority: Medium     Obesity 05/26/2015     Priority: Medium     CARDIOVASCULAR SCREENING; LDL GOAL LESS THAN 130 11/22/2011     Priority: Medium       ROS: 10 point ROS neg other than the symptoms noted above in the HPI.    Exam:  Constitutional: healthy, alert, active and no distress  Respiratory:No cough, no dyspnea, normal respiratory  rhythm  Neurologic: negative findings: speech normal, mental status intact  Psychiatric: mentation appears normal and affect normal/bright    Impression/Plan:    1. Severe Obstructive sleep apnea     Sleep study results were reviewed in detail with patient.  Severe obstructive sleep apnea, oxygen desaturations, consequent untreated disease and treatment options were reviewed.  Sleep apnea is recommended for severe sleep apnea and we can start auto titrating PAP therapy.  Patient opts for CPAP.    Plan:    1. Start auto CPAP therapy     He will follow up with me in about 2 month(s).     I spent a total of 15 minutes for this appointment today which include time spent before, during and after the visit for patient care, counseling and coordination of care.    Dr. Lazaro Keane     CC: Clinic, [facility]

## 2020-11-03 NOTE — PATIENT INSTRUCTIONS
Your BMI is Body mass index is 29.27 kg/m .  Weight management is a personal decision.  If you are interested in exploring weight loss strategies, the following discussion covers the approaches that may be successful. Body mass index (BMI) is one way to tell whether you are at a healthy weight, overweight, or obese. It measures your weight in relation to your height.  A BMI of 18.5 to 24.9 is in the healthy range. A person with a BMI of 25 to 29.9 is considered overweight, and someone with a BMI of 30 or greater is considered obese. More than two-thirds of American adults are considered overweight or obese.  Being overweight or obese increases the risk for further weight gain. Excess weight may lead to heart disease and diabetes.  Creating and following plans for healthy eating and physical activity may help you improve your health.  Weight control is part of healthy lifestyle and includes exercise, emotional health, and healthy eating habits. Careful eating habits lifelong are the mainstay of weight control. Though there are significant health benefits from weight loss, long-term weight loss with diet alone may be very difficult to achieve- studies show long-term success with dietary management in less than 10% of people. Attaining a healthy weight may be especially difficult to achieve in those with severe obesity. In some cases, medications, devices and surgical management might be considered.  What can you do?  If you are overweight or obese and are interested in methods for weight loss, you should discuss this with your provider.     Consider reducing daily calorie intake by 500 calories.     Keep a food journal.     Avoiding skipping meals, consider cutting portions instead.    Diet combined with exercise helps maintain muscle while optimizing fat loss. Strength training is particularly important for building and maintaining muscle mass. Exercise helps reduce stress, increase energy, and improves fitness.  Increasing exercise without diet control, however, may not burn enough calories to loose weight.       Start walking three days a week 10-20 minutes at a time    Work towards walking thirty minutes five days a week     Eventually, increase the speed of your walking for 1-2 minutes at time    In addition, we recommend that you review healthy lifestyles and methods for weight loss available through the National Institutes of Health patient information sites:  http://win.niddk.nih.gov/publications/index.htm    And look into health and wellness programs that may be available through your health insurance provider, employer, local community center, or frankie club.    Weight management plan: Patient was referred to their PCP to discuss a diet and exercise plan.

## 2020-11-05 ENCOUNTER — TELEPHONE (OUTPATIENT)
Dept: VASCULAR SURGERY | Facility: CLINIC | Age: 50
End: 2020-11-05

## 2020-11-05 NOTE — TELEPHONE ENCOUNTER
Lakhwinder was rescheduled from his 11/06/20 procedure due to Dr. Adkins being out of the office.  Lakhwinder needs a note for work that states dates of new procedures and any restrictions.  I will route to the nurse to type of note and send to patient.  Patient would like the note by Monday, November 9th.

## 2020-11-06 ENCOUNTER — MYC MEDICAL ADVICE (OUTPATIENT)
Dept: VASCULAR SURGERY | Facility: CLINIC | Age: 50
End: 2020-11-06

## 2020-11-06 NOTE — TELEPHONE ENCOUNTER
Patient returned call. Reviewed after care instruction of activity limitation with patient. Patient states he works as a  for a [employer] and will be expected to be on his feet for an extended period of time. He also works as a  for his second job. Informed patient writer will finalize his letter for work, include the two dates of his appointment and send it to him via e-mail. Writer agreed with plan and states no further questions.     Letter sent via [employer].

## 2020-11-10 ENCOUNTER — TELEPHONE (OUTPATIENT)
Dept: SLEEP MEDICINE | Facility: CLINIC | Age: 50
End: 2020-11-10

## 2020-11-10 DIAGNOSIS — G47.33 OSA (OBSTRUCTIVE SLEEP APNEA): ICD-10-CM

## 2020-11-10 NOTE — TELEPHONE ENCOUNTER
I called patient to schedule cpap setup appointment with [facility], left voicemail for him to call me back at my direct line to schedule.

## 2020-11-12 ENCOUNTER — TELEPHONE (OUTPATIENT)
Dept: SLEEP MEDICINE | Facility: CLINIC | Age: 50
End: 2020-11-12

## 2020-11-12 DIAGNOSIS — G47.33 OSA (OBSTRUCTIVE SLEEP APNEA): ICD-10-CM

## 2020-11-12 NOTE — TELEPHONE ENCOUNTER
I called patient to schedule new cpap setup, left voicemail for him to call me back at my direct line to schedule.

## 2020-11-18 ENCOUNTER — TELEPHONE (OUTPATIENT)
Dept: SLEEP MEDICINE | Facility: CLINIC | Age: 50
End: 2020-11-18

## 2020-11-18 DIAGNOSIS — G47.33 OSA (OBSTRUCTIVE SLEEP APNEA): ICD-10-CM

## 2020-11-18 NOTE — TELEPHONE ENCOUNTER
I called patient to schedule CPAP setup appointment with [facility]. He is scheduled for 11/19/2020 in [place] showroom with me at 9am. Confirmed address, date, time of appointment with patient.

## 2020-11-19 ENCOUNTER — APPOINTMENT (OUTPATIENT)
Dept: SLEEP MEDICINE | Facility: CLINIC | Age: 50
End: 2020-11-19
Payer: COMMERCIAL

## 2020-11-19 ENCOUNTER — DOCUMENTATION ONLY (OUTPATIENT)
Dept: SLEEP MEDICINE | Facility: CLINIC | Age: 50
End: 2020-11-19

## 2020-11-19 DIAGNOSIS — G47.33 OSA (OBSTRUCTIVE SLEEP APNEA): Primary | ICD-10-CM

## 2020-11-19 DIAGNOSIS — G47.33 OSA (OBSTRUCTIVE SLEEP APNEA): ICD-10-CM

## 2020-11-19 NOTE — PROGRESS NOTES
Patient was offered choice of vendor and chose [facility].  Patient Lakhwinder Solorio was set up at [facility]  on November 19, 2020. Patient received a Resmed AirSense 10 Auto. Pressures were set at 5-15 cm H2O.   Patient s ramp is 5 cm H2O for Auto and FLEX/EPR is EPR, 2.  Patient received a Resmed Mask name: Airfit F30i Full Face mask size Medium, heated tubing and heated humidifier.  Patient does not need to meet compliance. Patient has a follow up tbd  Bell Soler

## 2020-11-23 ENCOUNTER — DOCUMENTATION ONLY (OUTPATIENT)
Dept: SLEEP MEDICINE | Facility: CLINIC | Age: 50
End: 2020-11-23

## 2020-11-23 DIAGNOSIS — G47.33 OSA (OBSTRUCTIVE SLEEP APNEA): ICD-10-CM

## 2020-11-23 NOTE — PROGRESS NOTES
3 DAY STM VISIT    Diagnostic AHI: 35.9    PSG    Patient contacted for 3 day STM visit    Confirmed with patient at time of call- N/A Patient is still interested in STM service     Message left for patient to return call    Replacement device: No  STM ordered by provider: Yes     Device type: Auto-CPAP  PAP settings from order::  CPAP min 5 cm  H20     Mask type:    Full Face Mask      Assessment: No usage reporting but has a profile in [employer].  Action plan: Patient to have 14 day STM visit. Patient has a follow up visit scheduled:   not required by insurance.     Total time spent on accessing and  interpreting remote patient PAP therapy data  10 minutes  Total time spent counseling, coaching  and reviewing PAP therapy data with patient  0 minutes  [id number] no

## 2020-11-23 NOTE — PROGRESS NOTES
Patient returned call.    Subjective measures:   Patient has not yet started therapy.  He plans on starting in the next few days and will call with any questions or concerns.        Action plan:pt to have 14 day [facility] visit.      Total time spent counseling, coaching  and reviewing PAP therapy data with patient  3 minutes     [id number] (this call)    [id number] no (previous call)

## 2020-12-04 ENCOUNTER — OFFICE VISIT (OUTPATIENT)
Dept: VASCULAR SURGERY | Facility: CLINIC | Age: 50
End: 2020-12-04
Payer: COMMERCIAL

## 2020-12-04 VITALS — OXYGEN SATURATION: 96 % | DIASTOLIC BLOOD PRESSURE: 75 MMHG | HEART RATE: 72 BPM | SYSTOLIC BLOOD PRESSURE: 124 MMHG

## 2020-12-04 DIAGNOSIS — I83.813 VARICOSE VEINS OF BOTH LOWER EXTREMITIES WITH PAIN: Primary | ICD-10-CM

## 2020-12-04 DIAGNOSIS — I83.811 VARICOSE VEINS OF RIGHT LOWER EXTREMITY WITH PAIN: ICD-10-CM

## 2020-12-04 RX ORDER — HYDROCODONE BITARTRATE AND ACETAMINOPHEN 5; 325 MG/1; MG/1
1 TABLET ORAL EVERY 6 HOURS PRN
Qty: 2 TABLET | Refills: 0 | Status: SHIPPED | OUTPATIENT
Start: 2020-12-04 | End: 2021-01-26

## 2020-12-04 NOTE — LETTER
12/4/2020         RE: Lakhwinder Solorio  [address] [facility] [address]        Dear Colleague,    Thank you for referring your patient, Lakhwinder Solorio, to the [facility] [place]. Please see a copy of my visit note below.    Pre-procedure Nursing Note    Lakhwinder Solorio presents to clinic for Vein Procedure  .   /Person Responsible for Patient: Sarah (wife)  Phone Number: [phone] (ok to LVM)    Prophylactic Medication:N/A   Sedation Medication: Ativan, 3mg  ,   Time Taken: 0932 and Clonidine, 0.1 mg,   Time Taken: 0932  Compression Stockings: Patient brought  The procedure is being performed on RLE.  Patient understanding of procedure matches consent? YES    Patient's pre-procedure medications verified by RN, Benoit Domingo.    Benoit Domingo RN on 12/4/2020 at 9:31 AM        [facility] Procedure Note    Lakhwinder Solorio  December 8, 2020    Indications:  Right leg pain and varicose veins recalcitrant to conservative measures    Procedure:  1.  Radiofrequency ablation right great saphenous vein  2.  Multiple stab phlebectomies right leg-medically necessary    Surgeon:  JALEN Adkins MD    Procedure description  Details of procedure including risks of bleeding, infection, nerve injury, scarring, hyperpigmentation, deep vein thrombosis, recanalization of the great saphenous vein and recurrent varicose veins were discussed.  The patient voiced understanding and wished to proceed.  Informed consent was obtained.    Had the patient stand and marked varicosities coursing from his thigh to his distal leg with an indelible marker.  We then proceeded to the operating room, had him lie supine on the operating table, then prepped and draped the right groin and entire right lower extremity sterilely.    We took a timeout to confirm the appropriate operative site and procedure: Radiofrequency ablation of the right great saphenous vein with multiple phlebectomies.    VNUS Closure  I imaged the right  lower extremity with ultrasound and identified the right great saphenous vein.  I followed the great saphenous vein down to the mid calf level where the last major tributary coursed from the great saphenous vein.  Infiltrated the skin overlying the great saphenous vein at this level, then placed a micropuncture needle into the vein followed by a guidewire and a 7 Cypriot sheath.  We passed our closure fast device and positioned the device 1.94 cm from the saphenofemoral junction under ultrasound guidance with the operative table in Trendelenburg position.    Tumescent anesthetic was injected along the great saphenous vein under ultrasound guidance.  The same tumescent anesthetic was used to infiltrate the tissues surrounding each of the marked varicosities.    After allowing the block to take effect and after confirming catheter tip position, we applied compression to the great saphenous vein in the proximal thigh, treating the first 3 7 cm segments of the vein with 2 RF sessions each.  The remaining vein was treated with 1 RF session to each 7 cm increment unless there were higher energy readings requiring a second treatment.  Patient was comfortable throughout.    I reimaged the vein following the pullback and noted that the vein was noncompressible and closed.  The saphenofemoral junction and common femoral veins were fully compressible free of thrombus.  The sheath and the cath were removed and hemostasis secured with pressure.    Phlebectomies:  Stab wounds were made beside each marked varicosities and the veins retrieved with either vein hooks or todd hooks, clamped with mosquito clamps then a avulsed.  Hemostasis was secured with pressure.    After complete the phlebectomies leg was cleaned with saline solution and petroleum jelly applied to the skin.  We dressed the leg with ABD pads, cast padding and an Ace bandage from the toes to the groin.  We move the patient to recovery and observe him for 30 minutes  to ensure excellent hemostasis, then took him to his car in a wheelchair.  He tolerated the procedure well without evidence of allergic reaction or other complications and will return in approximately 72 hours for a right lower extremity venous ultrasound.    Post procedure instructions were given to the patient and his  and verbal and written form and their questions answered.    /75   Pulse 72   SpO2 96%     Flowsheet Data 12/4/2020   Procedure Start Time: 11:37 AM   Prep: Chloraprep   Side: Right   Tx Length (cm): Right GSV: 72   Junction (cm): Right GSV: 1.94   RF Cycles: Right GSV: 15   RF TX Time (Minutes): Right GSV: 5:00   # PHLEB Sites: 43   Sedation taken: Yes   Pre Pt. Physical / Cognitive Limitations: WNL   TOTAL Local anesthesia Injected (ml): 2   Max Volume Local Anesthesia (ml): 11   TOTAL Tumescent Injected volume (ml): 500   Max Volume Tumescent (ml): 572   Post Pt. Physical / Cognitive Limitations: WNL   Procedure End Time:  1:00 PM   D/C Instructions given, states readiness to leave and escorted to car: Yes       Venus Closure    Date/Time: 12/8/2020 8:55 AM  Performed by: Alfonso Adkins MD  Authorized by: Alfonso Adkins MD     Time out: Immediately prior to the procedure a time out was called    Preparation: Patient was prepped and draped in usual sterile fashion    1st Assist:  Debra Diaz CST/CSFA  Circulator:  Benoit Domingo RN  Procedure:  VNUS  Procedure side:  Right  One Vein    Vein Treated:  GSV  Patient tolerance:  Patient tolerated the procedure well with no immediate complications  Phlebectomy    Date/Time: 12/8/2020 8:55 AM  Performed by: Alfonso Adkins MD  Authorized by: Alfonso Adkins MD     Procedure:  Phlebectomies  Type:  Medically Necessary  Procedure side:  Right  Stabs:  >20  Patient tolerance:  Patient tolerated the procedure well with no immediate complications  Wrap/Hose:  Wraps        Alfonso Adkins  MD    Dictated using Dragon voice recognition software which may result in transcription errors      Again, thank you for allowing me to participate in the care of your patient.        Sincerely,        Alfonso Adkins MD

## 2020-12-04 NOTE — PROGRESS NOTES
Pre-procedure Nursing Note    Lakhwinder Solorio presents to clinic for Vein Procedure  .   /Person Responsible for Patient: Sarah (wife)  Phone Number: [phone] (ok to LVM)    Prophylactic Medication:N/A   Sedation Medication: Ativan, 3mg  ,   Time Taken: 0932 and Clonidine, 0.1 mg,   Time Taken: 0932  Compression Stockings: Patient brought  The procedure is being performed on RLE.  Patient understanding of procedure matches consent? YES    Patient's pre-procedure medications verified by RN, Benoit Domingo.    Benoit Domingo RN on 12/4/2020 at 9:31 AM

## 2020-12-07 ENCOUNTER — DOCUMENTATION ONLY (OUTPATIENT)
Dept: SLEEP MEDICINE | Facility: CLINIC | Age: 50
End: 2020-12-07

## 2020-12-07 ENCOUNTER — ANCILLARY PROCEDURE (OUTPATIENT)
Dept: ULTRASOUND IMAGING | Facility: CLINIC | Age: 50
End: 2020-12-07
Attending: SURGERY
Payer: COMMERCIAL

## 2020-12-07 ENCOUNTER — OFFICE VISIT (OUTPATIENT)
Dept: VASCULAR SURGERY | Facility: CLINIC | Age: 50
End: 2020-12-07
Attending: SURGERY
Payer: COMMERCIAL

## 2020-12-07 DIAGNOSIS — G47.33 OSA (OBSTRUCTIVE SLEEP APNEA): ICD-10-CM

## 2020-12-07 DIAGNOSIS — I83.813 VARICOSE VEINS OF BOTH LOWER EXTREMITIES WITH PAIN: ICD-10-CM

## 2020-12-07 DIAGNOSIS — Z09 POSTOP CHECK: Primary | ICD-10-CM

## 2020-12-07 PROCEDURE — 99207 PR NO CHARGE NURSE ONLY: CPT

## 2020-12-07 PROCEDURE — 93971 EXTREMITY STUDY: CPT | Mod: RT | Performed by: SURGERY

## 2020-12-07 NOTE — PROGRESS NOTES
14  DAY STM VISIT    Diagnostic AHI: 35.9    PSG    Message left for patient to return call     Assessment: Pt meeting objective benchmarks.       Action plan: waiting for patient to return call.  and pt to have 30 day STM visit.      Device type: Auto-CPAP    PAP settings: CPAP min 5.0 cm  H20       CPAP max 15.0 cm  H20             95th% pressure 11.4 cm  H20        RESMED EPR level Setting: TWO    RESMED Soft response setting:  OFF    Mask type:  Full Face Mask    Objective measures: 14 day rolling measures      Compliance  85 %      Leak  10.7  lpm  last  upload      AHI 2.77   last  upload      Average number of minutes 337      Objective measure goal  Compliance   Goal >70%  Leak   Goal < 24 lpm  AHI  Goal < 5  Usage  Goal >240        Total time spent on accessing and interpreting remote patient PAP therapy data  10 minutes    Total time spent counseling, coaching  and reviewing PAP therapy data with patient  0 minutes    [id number]  [id number]  no (3 day STM)

## 2020-12-07 NOTE — PROGRESS NOTES
Postoperative Nurse Note    Patient is here for their 72 hour postoperative visit.    Procedure: Right leg VNUS closure GSV(med nec), >20 stab phlebs(med nec)   Procedure Date: 12/4/2020  Surgeon: Dr. Adkins    Ultrasound Result: The right GSV is closed 3.9mm from the SFJ to the mid calf with evidence of thrombus seen throughout. No evidence of RLE DVT.    Physical Exam: Incisions are approximated without signs of infection.  Ecchymosis: moderate  Swelling: minimal  Paresthesia: pt denies numbness    Patient Questions or Concerns: Pt is doing well and has no concerns. Denies pain.    Reviewed postoperative instructions with patient and provided them with written material of common things to expect from their procedure.     Patient's Next [facility] Appointment: Left leg procedure this Friday 12/11 with Dr. Adkins.    Priscilla Shrestha RN

## 2020-12-07 NOTE — LETTER
12/7/2020         RE: Lakhwinder Solorio  [facility] [facility] [address]        Dear Colleague,    Thank you for referring your patient, Lakhwinder Solorio, to the [facility] [place]. Please see a copy of my visit note below.        Postoperative Nurse Note    Patient is here for their 72 hour postoperative visit.    Procedure: Right leg VNUS closure GSV(med nec), >20 stab phlebs(med nec)   Procedure Date: 12/4/2020  Surgeon: Dr. Adkins    Ultrasound Result: The right GSV is closed 3.9mm from the SFJ to the mid calf with evidence of thrombus seen throughout. No evidence of RLE DVT.    Physical Exam: Incisions are approximated without signs of infection.  Ecchymosis: moderate  Swelling: minimal  Paresthesia: pt denies numbness    Patient Questions or Concerns: Pt is doing well and has no concerns. Denies pain.    Reviewed postoperative instructions with patient and provided them with written material of common things to expect from their procedure.     Patient's Next [facility] Appointment: Left leg procedure this Friday 12/11 with Dr. Adkins.    Priscilla Shrestha RN      Again, thank you for allowing me to participate in the care of your patient.        Sincerely,         Vein Nurse

## 2020-12-08 PROCEDURE — 37766 PHLEB VEINS - EXTREM 20+: CPT | Mod: RT | Performed by: SURGERY

## 2020-12-08 PROCEDURE — 36475 ENDOVENOUS RF 1ST VEIN: CPT | Mod: RT | Performed by: SURGERY

## 2020-12-08 NOTE — PROGRESS NOTES
[facility] Procedure Note    Lakhwinder Solorio  December 8, 2020    Indications:  Right leg pain and varicose veins recalcitrant to conservative measures    Procedure:  1.  Radiofrequency ablation right great saphenous vein  2.  Multiple stab phlebectomies right leg-medically necessary    Surgeon:  JALEN Adkins MD    Procedure description  Details of procedure including risks of bleeding, infection, nerve injury, scarring, hyperpigmentation, deep vein thrombosis, recanalization of the great saphenous vein and recurrent varicose veins were discussed.  The patient voiced understanding and wished to proceed.  Informed consent was obtained.    Had the patient stand and marked varicosities coursing from his thigh to his distal leg with an indelible marker.  We then proceeded to the operating room, had him lie supine on the operating table, then prepped and draped the right groin and entire right lower extremity sterilely.    We took a timeout to confirm the appropriate operative site and procedure: Radiofrequency ablation of the right great saphenous vein with multiple phlebectomies.    VNUS Closure  I imaged the right lower extremity with ultrasound and identified the right great saphenous vein.  I followed the great saphenous vein down to the mid calf level where the last major tributary coursed from the great saphenous vein.  Infiltrated the skin overlying the great saphenous vein at this level, then placed a micropuncture needle into the vein followed by a guidewire and a 7 Occitan sheath.  We passed our closure fast device and positioned the device 1.94 cm from the saphenofemoral junction under ultrasound guidance with the operative table in Trendelenburg position.    Tumescent anesthetic was injected along the great saphenous vein under ultrasound guidance.  The same tumescent anesthetic was used to infiltrate the tissues surrounding each of the marked varicosities.    After allowing the block to take effect and  after confirming catheter tip position, we applied compression to the great saphenous vein in the proximal thigh, treating the first 3 7 cm segments of the vein with 2 RF sessions each.  The remaining vein was treated with 1 RF session to each 7 cm increment unless there were higher energy readings requiring a second treatment.  Patient was comfortable throughout.    I reimaged the vein following the pullback and noted that the vein was noncompressible and closed.  The saphenofemoral junction and common femoral veins were fully compressible free of thrombus.  The sheath and the cath were removed and hemostasis secured with pressure.    Phlebectomies:  Stab wounds were made beside each marked varicosities and the veins retrieved with either vein hooks or todd hooks, clamped with mosquito clamps then a avulsed.  Hemostasis was secured with pressure.    After complete the phlebectomies leg was cleaned with saline solution and petroleum jelly applied to the skin.  We dressed the leg with ABD pads, cast padding and an Ace bandage from the toes to the groin.  We move the patient to recovery and observe him for 30 minutes to ensure excellent hemostasis, then took him to his car in a wheelchair.  He tolerated the procedure well without evidence of allergic reaction or other complications and will return in approximately 72 hours for a right lower extremity venous ultrasound.    Post procedure instructions were given to the patient and his  and verbal and written form and their questions answered.    /75   Pulse 72   SpO2 96%     Flowsheet Data 12/4/2020   Procedure Start Time: 11:37 AM   Prep: Chloraprep   Side: Right   Tx Length (cm): Right GSV: 72   Junction (cm): Right GSV: 1.94   RF Cycles: Right GSV: 15   RF TX Time (Minutes): Right GSV: 5:00   # PHLEB Sites: 43   Sedation taken: Yes   Pre Pt. Physical / Cognitive Limitations: WNL   TOTAL Local anesthesia Injected (ml): 2   Max Volume Local Anesthesia  (ml): 11   TOTAL Tumescent Injected volume (ml): 500   Max Volume Tumescent (ml): 572   Post Pt. Physical / Cognitive Limitations: WNL   Procedure End Time:  1:00 PM   D/C Instructions given, states readiness to leave and escorted to car: Yes       [place] Closure    Date/Time: 12/8/2020 8:55 AM  Performed by: Alfonso Adkins MD  Authorized by: Alfonso Adkins MD     Time out: Immediately prior to the procedure a time out was called    Preparation: Patient was prepped and draped in usual sterile fashion    1st Assist:  Debra Diaz, CST/CSFA  Circulator:  Benoit Domingo RN  Procedure:  VNUS  Procedure side:  Right  One Vein    Vein Treated:  GSV  Patient tolerance:  Patient tolerated the procedure well with no immediate complications  Phlebectomy    Date/Time: 12/8/2020 8:55 AM  Performed by: Alfonso Adkins MD  Authorized by: Alfonso Adkins MD     Procedure:  Phlebectomies  Type:  Medically Necessary  Procedure side:  Right  Stabs:  >20  Patient tolerance:  Patient tolerated the procedure well with no immediate complications  Wrap/Hose:  Wraps        Alfonso Adkins MD    Dictated using Dragon voice recognition software which may result in transcription errors

## 2020-12-11 ENCOUNTER — OFFICE VISIT (OUTPATIENT)
Dept: VASCULAR SURGERY | Facility: CLINIC | Age: 50
End: 2020-12-11
Payer: COMMERCIAL

## 2020-12-11 VITALS — SYSTOLIC BLOOD PRESSURE: 122 MMHG | OXYGEN SATURATION: 97 % | DIASTOLIC BLOOD PRESSURE: 74 MMHG | HEART RATE: 75 BPM

## 2020-12-11 DIAGNOSIS — I83.812 VARICOSE VEINS OF LEFT LOWER EXTREMITY WITH PAIN: ICD-10-CM

## 2020-12-11 DIAGNOSIS — I83.892 VARICOSE VEINS OF LEG WITH SWELLING, LEFT: ICD-10-CM

## 2020-12-11 PROCEDURE — 37765 STAB PHLEB VEINS XTR 10-20: CPT | Mod: 79 | Performed by: SURGERY

## 2020-12-11 PROCEDURE — 36475 ENDOVENOUS RF 1ST VEIN: CPT | Mod: 79 | Performed by: SURGERY

## 2020-12-11 NOTE — PROGRESS NOTES
Pre-procedure Nursing Note    Lakhwinder Solorio presents to clinic for Vein Procedure  .   /Person Responsible for Patient: Trupti (wife)  Phone Number: [phone] (ok to LVM)    *Patient states he still has norco script from 1st procedure.*    Prophylactic Medication:N/A   Sedation Medication: Ativan, 3mg ,   Time Taken: 1220 and Clonidine, 0.1 mg,   Time Taken: 1220  Compression Stockings: Hose at home  The procedure is being performed on LLE.  Patient understanding of procedure matches consent? YES    Patient's pre-procedure medications verified by RN, Benoit Domingo.    Benoit Domingo RN on 12/11/2020 at 12:19 PM

## 2020-12-11 NOTE — LETTER
12/11/2020         RE: Lakhwinder Solorio  [address] [facility] [address]        Dear Colleague,    Thank you for referring your patient, Lakhwinder Solorio, to the [facility] [place]. Please see a copy of my visit note below.    Pre-procedure Nursing Note    Lakhwinder Solorio presents to clinic for Vein Procedure  .   /Person Responsible for Patient: Trupti (wife)  Phone Number: [phone] (ok to LVM)    *Patient states he still has norco script from 1st procedure.*    Prophylactic Medication:N/A   Sedation Medication: Ativan, 3mg ,   Time Taken: 1220 and Clonidine, 0.1 mg,   Time Taken: 1220  Compression Stockings: Hose at home  The procedure is being performed on E.  Patient understanding of procedure matches consent? YES    Patient's pre-procedure medications verified by RN, Benoit Domingo.    Benoit Domingo RN on 12/11/2020 at 12:19 PM        [facility] Procedure Note    Indications:  Left leg pain and varicose veins recalcitrant to conservative measures    Procedure:  1. Radiofrequency ablation left great saphenous vein  2. Multiple stab phlebectomies left leg-medically necessary (16 stabs    Surgeon  CP Jed DUNHAM    Procedure Description  Details of the procedure including risks of bleeding, infection, nerve injury, scarring, hyperpigmentation, deep vein thrombosis, recanalization of the great saphenous vein and recurrent varicose veins all discussed.  The patient voiced understanding and wished to proceed.  Informed consent was obtained.     I imaged the lower extremity marking varicosities coursing from the level of the knee down the medial leg to just distal to the mid leg with an indelible marker.  We then proceeded the operating room, had the patient lie supine on the operating table, then prepped and draped the left lower extremity sterilely.    We took a timeout to confirm the appropriate operative site and procedure: Radiofrequency ablation left great saphenous vein  with multiple medically necessary phlebectomies.    VNUS Closure  I imaged the left lower extremity easily identifying the left great saphenous vein.  I infiltrated the skin overlying the vein in the distal third of the thigh with 1% lidocaine with bicarbonate, placed a micropuncture needle into the vein followed by a micropuncture guidewire and a 7 Estonian sheath.    We passed the closure fast device through the sheath, positioning of the tip of the device 2.11 centimeters from the saphenofemoral junction under ultrasound guidance with the operating table in Trendelenburg position.  Tumescent anesthetic was injected along the course of the vein under ultrasound guidance with care to confirm catheter tip position 2.11 cm from the saphenofemoral junction prior to infiltrating the tissues in this location.  The same tumescent anesthetic was injected around each of the marked varicosities.    After allowing the block to take effect and after confirming appropriate position, I applied compression to the proximal left great saphenous vein with the ultrasound probe and additional width 2 fingerbreadths treating the first two 7 cm increments of the vein with 2 RF sessions each.  The remaining vein was treated with 1 RF session to each 7cm increment with the exception of segments of vein where energy readings were higher requiring additional treatments.  We treated down to just beyond the mid thigh.  The patient was comfortable throughout.    After completing the pullback, I reimaged the vein and the vein to be non-compressible and closed.  The saphenofemoral junction and common femoral veins were fully compressible and free of thrombus. The sheath and the catheter were removed and hemostasis secured with pressure.    Phlebectomies  We made stab wounds beside each of the marked varicosities with 11 scalpel, retrieved the veins with either vein hooks or todd hooks, clamped them with mosquito clamps and a avulsed them.   Hemostasis was secured with pressure.    After completing the phlebectomies, the leg was cleaned with saline solution and petroleum jelly was applied to the skin.  The leg was then dressed with ABD pads, cast padding and an Ace bandage from the toes to the groin.   We observed the patient for 30 minutes to ensure excellent hemostasis then took him to his car in a wheelchair.  Post procedure instructions were given to the patient and his wife in verbal and written form.  They both voiced understanding and their questions were answered.    The patient tolerated the procedure well without evidence of allergic reaction or other complications and will return in 72 hours for a left lower extremity venous ultrasound.    [place] Closure    Date/Time: 12/11/2020 1:54 PM  Performed by: Alfonso Adkins MD  Authorized by: Alfonso Adkins MD     Time out: Immediately prior to the procedure a time out was called    Preparation: Patient was prepped and draped in usual sterile fashion    1st Assist:  Debra Diaz, CST/CSFA  Circulator:  Luci Trujillo MA  Procedure:  VNUS  Procedure side:  Left  One Vein    Vein Treated:  GSV  Patient tolerance:  Patient tolerated the procedure well with no immediate complications  Phlebectomy    Date/Time: 12/11/2020 1:55 PM  Performed by: Alfonso Adkins MD  Authorized by: Alfonso Adkins MD     Procedure:  Phlebectomies  Type:  Medically Necessary  Procedure side:  Left  Stabs:  10-20  Patient tolerance:  Patient tolerated the procedure well with no immediate complications  Wrap/Hose:  Wraps        Flowsheet Data 12/11/2020   Procedure Start Time:  1:06 PM   Prep: Chloraprep   Side: Left   Tx Length (cm): LEFT GSV:  33   Junction (cm): LEFT GSV:  2.11   RF Cycles: LEFT GSV:  7   RF TX Time (Minutes): LEFT GSV:  2:20   # PHLEB Sites: 13   Sedation taken: Yes   Pre Pt. Physical / Cognitive Limitations: WNL   TOTAL Local anesthesia Injected (ml): 3   Max  Volume Local Anesthesia (ml): 11   TOTAL Tumescent Injected volume (ml): 282   Max Volume Tumescent (ml): 572   Post Pt. Physical / Cognitive Limitations: WNL   Procedure End Time:  1:43 PM   D/C Instructions given, states readiness to leave and escorted to car: Yes       C Trevor Adkins MD    Dictated using Dragon voice recognition software which may result in transcription errors      Again, thank you for allowing me to participate in the care of your patient.        Sincerely,        Alfonso Adkins MD

## 2020-12-11 NOTE — PROGRESS NOTES
[facility] Procedure Note    Indications:  Left leg pain and varicose veins recalcitrant to conservative measures    Procedure:  1. Radiofrequency ablation left great saphenous vein  2. Multiple stab phlebectomies left leg-medically necessary (16 stabs    Surgeon  CP Jed DUNHAM    Procedure Description  Details of the procedure including risks of bleeding, infection, nerve injury, scarring, hyperpigmentation, deep vein thrombosis, recanalization of the great saphenous vein and recurrent varicose veins all discussed.  The patient voiced understanding and wished to proceed.  Informed consent was obtained.     I imaged the lower extremity marking varicosities coursing from the level of the knee down the medial leg to just distal to the mid leg with an indelible marker.  We then proceeded the operating room, had the patient lie supine on the operating table, then prepped and draped the left lower extremity sterilely.    We took a timeout to confirm the appropriate operative site and procedure: Radiofrequency ablation left great saphenous vein with multiple medically necessary phlebectomies.    VNUS Closure  I imaged the left lower extremity easily identifying the left great saphenous vein.  I infiltrated the skin overlying the vein in the distal third of the thigh with 1% lidocaine with bicarbonate, placed a micropuncture needle into the vein followed by a micropuncture guidewire and a 7 [place]ian sheath.    We passed the closure fast device through the sheath, positioning of the tip of the device 2.11 centimeters from the saphenofemoral junction under ultrasound guidance with the operating table in Trendelenburg position.  Tumescent anesthetic was injected along the course of the vein under ultrasound guidance with care to confirm catheter tip position 2.11 cm from the saphenofemoral junction prior to infiltrating the tissues in this location.  The same tumescent anesthetic was injected around each of the marked  varicosities.    After allowing the block to take effect and after confirming appropriate position, I applied compression to the proximal left great saphenous vein with the ultrasound probe and additional width 2 fingerbreadths treating the first two 7 cm increments of the vein with 2 RF sessions each.  The remaining vein was treated with 1 RF session to each 7cm increment with the exception of segments of vein where energy readings were higher requiring additional treatments.  We treated down to just beyond the mid thigh.  The patient was comfortable throughout.    After completing the pullback, I reimaged the vein and the vein to be non-compressible and closed.  The saphenofemoral junction and common femoral veins were fully compressible and free of thrombus. The sheath and the catheter were removed and hemostasis secured with pressure.    Phlebectomies  We made stab wounds beside each of the marked varicosities with 11 scalpel, retrieved the veins with either vein hooks or todd hooks, clamped them with mosquito clamps and a avulsed them.  Hemostasis was secured with pressure.    After completing the phlebectomies, the leg was cleaned with saline solution and petroleum jelly was applied to the skin.  The leg was then dressed with ABD pads, cast padding and an Ace bandage from the toes to the groin.   We observed the patient for 30 minutes to ensure excellent hemostasis then took him to his car in a wheelchair.  Post procedure instructions were given to the patient and his wife in verbal and written form.  They both voiced understanding and their questions were answered.    The patient tolerated the procedure well without evidence of allergic reaction or other complications and will return in 72 hours for a left lower extremity venous ultrasound.    [place] Closure    Date/Time: 12/11/2020 1:54 PM  Performed by: Alfonso Adkins MD  Authorized by: Alfonso Adkins MD     Time out: Immediately  prior to the procedure a time out was called    Preparation: Patient was prepped and draped in usual sterile fashion    1st Assist:  Debra Diaz, CST/CSFA  Circulator:  Luci Trujillo MA  Procedure:  VNUS  Procedure side:  Left  One Vein    Vein Treated:  GSV  Patient tolerance:  Patient tolerated the procedure well with no immediate complications  Phlebectomy    Date/Time: 12/11/2020 1:55 PM  Performed by: Alfonso Adkins MD  Authorized by: Alfonso Adkins MD     Procedure:  Phlebectomies  Type:  Medically Necessary  Procedure side:  Left  Stabs:  10-20  Patient tolerance:  Patient tolerated the procedure well with no immediate complications  Wrap/Hose:  Wraps        Flowsheet Data 12/11/2020   Procedure Start Time:  1:06 PM   Prep: Chloraprep   Side: Left   Tx Length (cm): LEFT GSV:  33   Junction (cm): LEFT GSV:  2.11   RF Cycles: LEFT GSV:  7   RF TX Time (Minutes): LEFT GSV:  2:20   # PHLEB Sites: 13   Sedation taken: Yes   Pre Pt. Physical / Cognitive Limitations: WNL   TOTAL Local anesthesia Injected (ml): 3   Max Volume Local Anesthesia (ml): 11   TOTAL Tumescent Injected volume (ml): 282   Max Volume Tumescent (ml): 572   Post Pt. Physical / Cognitive Limitations: WNL   Procedure End Time:  1:43 PM   D/C Instructions given, states readiness to leave and escorted to car: Yes       LYNDSAY Adkins MD    Dictated using Dragon voice recognition software which may result in transcription errors

## 2020-12-14 ENCOUNTER — OFFICE VISIT (OUTPATIENT)
Dept: VASCULAR SURGERY | Facility: CLINIC | Age: 50
End: 2020-12-14
Attending: SURGERY
Payer: COMMERCIAL

## 2020-12-14 ENCOUNTER — ANCILLARY PROCEDURE (OUTPATIENT)
Dept: ULTRASOUND IMAGING | Facility: CLINIC | Age: 50
End: 2020-12-14
Attending: SURGERY
Payer: COMMERCIAL

## 2020-12-14 DIAGNOSIS — Z09 POSTOP CHECK: Primary | ICD-10-CM

## 2020-12-14 DIAGNOSIS — I83.813 VARICOSE VEINS OF BOTH LOWER EXTREMITIES WITH PAIN: ICD-10-CM

## 2020-12-14 PROCEDURE — 93971 EXTREMITY STUDY: CPT | Mod: LT | Performed by: SURGERY

## 2020-12-14 PROCEDURE — 99207 PR NO CHARGE NURSE ONLY: CPT

## 2020-12-14 NOTE — LETTER
12/14/2020         RE: Lakhwinder Solorio  [facility] [address]        Dear Colleague,    Thank you for referring your patient, Lakhwinder Solorio, to the [facility] [place]. Please see a copy of my visit note below.        Postoperative Nurse Note    Patient is here for their 72 hour postoperative visit.    Procedure: Left leg VNUS closure GSV(med nec), 10-20 stab phlebs (med nec)  Procedure Date: 12/4/2020  Surgeon: Dr. Adkins    Ultrasound Result: The left GSV is closed 18.1mm from the SFJ to the distal thigh with evidence of thrombus throughout. No evidence of LLE DVT.    Physical Exam: Incisions are approximated without signs of infection.  Ecchymosis: minimal  Swelling: minimal  Paresthesia: pt denies numbness    Patient Questions or Concerns: Pt is doing well and has no concerns. Denies pain.    Reviewed postoperative instructions with patient and provided them with written material of common things to expect from their procedure.     Patient's Next [facility] Appointment: 6 week post op with Dr. Adkins (1/26).    Priscilla Shrestha RN      Again, thank you for allowing me to participate in the care of your patient.        Sincerely,         Vein Nurse

## 2020-12-14 NOTE — PROGRESS NOTES
Postoperative Nurse Note    Patient is here for their 72 hour postoperative visit.    Procedure: Left leg VNUS closure GSV(med nec), 10-20 stab phlebs (med nec)  Procedure Date: 12/4/2020  Surgeon: Dr. Adkins    Ultrasound Result: The left GSV is closed 18.1mm from the SFJ to the distal thigh with evidence of thrombus throughout. No evidence of LLE DVT.    Physical Exam: Incisions are approximated without signs of infection.  Ecchymosis: minimal  Swelling: minimal  Paresthesia: pt denies numbness    Patient Questions or Concerns: Pt is doing well and has no concerns. Denies pain.    Reviewed postoperative instructions with patient and provided them with written material of common things to expect from their procedure.     Patient's Next [facility] Appointment: 6 week post op with Dr. Adkins (1/26).    Priscilla Shrestha RN

## 2020-12-14 NOTE — NURSING NOTE
Patient noted a bump in right leg. Ultrasound check complete for a quick view. Showed flushed GSV, which was absent at 72 hours postop. Per Dr. Adkins, start patient on 325 mg asa one time per day for one week. Explained instructions to patient and wife ().

## 2020-12-21 ENCOUNTER — DOCUMENTATION ONLY (OUTPATIENT)
Dept: SLEEP MEDICINE | Facility: CLINIC | Age: 50
End: 2020-12-21

## 2020-12-21 DIAGNOSIS — G47.33 OSA (OBSTRUCTIVE SLEEP APNEA): ICD-10-CM

## 2020-12-21 NOTE — PROGRESS NOTES
30 DAY [facility] VISIT    Diagnostic AHI: 35.9    PSG    Subjective measures:   Patient reports he is not yet feeling benefit from therapy.  He feels that he is waking up more than he did before the machine.  He is waking up about four times per night.  He will be awake 5- 60 min during these wake times.  He does not leave the bed during wake time.  He feels that maybe be an increase in air pressure would be helpful.  He is no longer snoring with the device.      He is also having dry mouth.      Assessment: Pt meeting objective benchmarks.  Patient failing following subjective benchmarks: not feeling benefit from therapy and dryness    Action plan: pended order placed to provider for pressure change to 8-15 cm h20   Patient has not scheduled a follow up visit with Dr. Keane  2 wk [facility] recheck scheduled.   Device type: Auto-CPAP  PAP settings: CPAP min 5.0 cm  H20     CPAP max 15.0 cm  H20         95th% pressure 11 cm  H20      RESMED EPR level Setting: TWO    RESMED Soft response setting:  OFF  Mask type:  Full Face Mask  Objective measures: 14 day rolling measures      Compliance  100 %      Leak  4.16 lpm  last  upload      AHI 2.11   last  upload      Average number of minutes 414      Objective measure goal  Compliance   Goal >70%  Leak   Goal < 24 lpm  AHI  Goal < 5  Usage  Goal >240        Total time spent on accessing and interpreting remote patient PAP therapy data  12 minutes    Total time spent counseling, coaching  and reviewing PAP therapy data with patient  7 minutes     [id number] this call  [id number] no  at 3 or 14 day [facility]

## 2021-01-04 ENCOUNTER — DOCUMENTATION ONLY (OUTPATIENT)
Dept: SLEEP MEDICINE | Facility: CLINIC | Age: 51
End: 2021-01-04

## 2021-01-04 DIAGNOSIS — G47.33 OSA (OBSTRUCTIVE SLEEP APNEA): ICD-10-CM

## 2021-01-04 NOTE — PROGRESS NOTES
STM recheck after pressure change    Diagnostic AHI: 35.9    PSG    Message left for patient to return call     Assessment: Pt meeting objective benchmarks.       Action plan: waiting for patient to return call.       Device type: Auto-CPAP    PAP settings: CPAP min 8.0 cm  H20       CPAP max 15.0 cm  H20            95th% pressure 11 cm  H20        RESMED EPR level Setting: TWO    RESMED Soft response setting:  OFF    Mask type:  Full Face Mask    Objective measures: 14 day rolling measures      Compliance  92 %      Leak  7.63  lpm  last  upload      AHI 1.36   last  upload      Average number of minutes 372      Objective measure goal  Compliance   Goal >70%  Leak   Goal < 24 lpm  AHI  Goal < 5  Usage  Goal >240        Total time spent on accessing and interpreting remote patient PAP therapy data  10 minutes    Total time spent counseling, coaching  and reviewing PAP therapy data with patient  0 minutes    [id number]

## 2021-01-06 NOTE — PROGRESS NOTES
Patient returned call.    Subjective measures:   Patient reports things are much better since pressure change.  He is feeling like he is waking up less.     Assessment: Pt meeting objective benchmarks.  Patient meeting subjective benchmarks.     Action plan:follow up per provider request      Total time spent counseling, coaching  and reviewing PAP therapy data with patient  3 minutes     [id number] (this call)    [id number] no (previous call)

## 2021-01-26 ENCOUNTER — OFFICE VISIT (OUTPATIENT)
Dept: VASCULAR SURGERY | Facility: CLINIC | Age: 51
End: 2021-01-26
Payer: COMMERCIAL

## 2021-01-26 DIAGNOSIS — I83.813 VARICOSE VEINS OF BOTH LOWER EXTREMITIES WITH PAIN: Primary | ICD-10-CM

## 2021-01-26 PROCEDURE — 99207 PR VEINSOLUTIONS POST OPERATIVE VISIT: CPT | Performed by: SURGERY

## 2021-01-26 NOTE — LETTER
1/26/2021         RE: Lakhwinder Solorio  [facility] [place] [address]        Dear Colleague,    Thank you for referring your patient, Lakhwinder Solorio, to the [facility] [place]. Please see a copy of my visit note below.    Solution 6-week postop note  Lakhwinder Solorio returns in follow-up of radiofrequency ablation of his right great saphenous vein with multiple phlebectomies on 12/4/20 and radiofrequency ablation of his left great saphenous vein with phlebectomies on 12/11/2020.  Post procedure ultrasound revealed that the great saphenous veins were closed bilaterally with no evidence of deep vein thrombosis.    The patient states that his leg pain has essentially dissipated.  He does admit that he still has some pruritus either late in the evenings just before retiring to bed or immediately on arising in the morning from sleep.  This generally does not bother him during the day and is much improved over his preoperative pruritus.    Physical exam  General: Pleasant gentleman in no acute distress  Right lower extremity: No visible varicose veins.  The phlebectomy sites are healing satisfactorily.  There is mild ecchymosis and minimal induration.  Trace edema of his right ankle.    Left lower extremity: No residual varicose veins are appreciated.  Minimal ecchymosis along the phlebectomy sites.  Phlebectomy sites of cells are healing well.  No left ankle edema.    Impression  Good results following endovenous ablation of his bilateral great saphenous veins.  He has happy with results and is not troubled by the lingering, mild pruritus of his right lower extremity.  I informed him that this may continue to improve with time and recommend that he use some moisturizers on his leg.  I did not appreciate any rash at this time.    I reviewed his preoperative ultrasound which notes no significant deep vein valve incompetence.  I did encourage him to wear his compression hose on long car rides, long  plane flights and on days when he knows he will be sitting for long periods of time.  He understands that the hose are less necessary when he is actively exercising.    Plan  He will return for a 1 year post venous ultrasound if he has concerns or questions.  Otherwise we will see him on an as-needed basis.    LYNDSAY Adkins MD    Dictated using Dragon voice recognition software which may result in transcription errors      Again, thank you for allowing me to participate in the care of your patient.        Sincerely,        Alfonso Adkins MD

## 2021-01-26 NOTE — PROGRESS NOTES
Solution 6-week postop note  Lakhwinder Solorio returns in follow-up of radiofrequency ablation of his right great saphenous vein with multiple phlebectomies on 12/4/20 and radiofrequency ablation of his left great saphenous vein with phlebectomies on 12/11/2020.  Post procedure ultrasound revealed that the great saphenous veins were closed bilaterally with no evidence of deep vein thrombosis.    The patient states that his leg pain has essentially dissipated.  He does admit that he still has some pruritus either late in the evenings just before retiring to bed or immediately on arising in the morning from sleep.  This generally does not bother him during the day and is much improved over his preoperative pruritus.    Physical exam  General: Pleasant gentleman in no acute distress  Right lower extremity: No visible varicose veins.  The phlebectomy sites are healing satisfactorily.  There is mild ecchymosis and minimal induration.  Trace edema of his right ankle.    Left lower extremity: No residual varicose veins are appreciated.  Minimal ecchymosis along the phlebectomy sites.  Phlebectomy sites of cells are healing well.  No left ankle edema.    Impression  Good results following endovenous ablation of his bilateral great saphenous veins.  He has happy with results and is not troubled by the lingering, mild pruritus of his right lower extremity.  I informed him that this may continue to improve with time and recommend that he use some moisturizers on his leg.  I did not appreciate any rash at this time.    I reviewed his preoperative ultrasound which notes no significant deep vein valve incompetence.  I did encourage him to wear his compression hose on long car rides, long plane flights and on days when he knows he will be sitting for long periods of time.  He understands that the hose are less necessary when he is actively exercising.    Plan  He will return for a 1 year post venous ultrasound if he has concerns or  questions.  Otherwise we will see him on an as-needed basis.    C Trevor Adkins MD    Dictated using Dragon voice recognition software which may result in transcription errors

## 2021-04-04 ENCOUNTER — HEALTH MAINTENANCE LETTER (OUTPATIENT)
Age: 51
End: 2021-04-04

## 2021-04-07 ENCOUNTER — TELEPHONE (OUTPATIENT)
Dept: FAMILY MEDICINE | Facility: CLINIC | Age: 51
End: 2021-04-07

## 2021-04-07 NOTE — TELEPHONE ENCOUNTER
Patient Quality Outreach      Summary:    Patient has the following on her problem list/HM:   Depression / Dysthymia review    12 Month Remission: 10-14 month window range: 5/3/2021       PHQ-9 SCORE 3/4/2020 10/8/2020   PHQ-9 Total Score 11 10       If PHQ-9 recheck is 5 or more, route to provider for next steps.    Patient is due/failing the following:   PHQ-9 Needed    Type of outreach:    Sent [employer] message.    Questions for provider review:    None                                                                                     Jayy FERNANDES CMA

## 2021-04-09 NOTE — TELEPHONE ENCOUNTER
Patient Contact    Called patient. He states he does not have time to complete right now. He will check [employer] message and states he should be able to complete by Monday.     Postponing for follow-up on Monday.

## 2021-06-04 ENCOUNTER — DOCUMENTATION ONLY (OUTPATIENT)
Dept: SLEEP MEDICINE | Facility: CLINIC | Age: 51
End: 2021-06-04

## 2021-06-04 DIAGNOSIS — G47.33 OSA (OBSTRUCTIVE SLEEP APNEA): ICD-10-CM

## 2021-06-04 NOTE — PROGRESS NOTES
6 month [facility] Recheck Visit     Diagnostic AHI: 35.9  PSG    Data only recheck     Assessment: Pt meeting objective benchmarks.     Action plan:   pt to follow up per provider request       Device type: Auto-CPAP  PAP settings: CPAP min 8.0 cm  H20     CPAP max 15.0 cm  H20    95th% pressure 11.2 cm  H20   Objective measures: 14 day rolling measures      Compliance  92 %      Leak  15.51 lpm  last  upload      AHI 1.51   last  upload      Average number of minutes 366      Objective measure goal  Compliance   Goal >70%  Leak   Goal < 24 lpm  AHI  Goal < 5  Usage  Goal >240    Total time spent on accessing, reviewing and interpreting remote patient PAP therapy data:   2 minutes      Total time spent with direct patient communication :   0 minutes

## 2021-06-20 NOTE — LETTER
Letter by Adela Almaguer RN at      Author: Adela Almaguer RN Service: -- Author Type: --    Filed:  Encounter Date: 7/16/2020 Status: (Other)       7/16/2020        Lakhwinder Solorio  [address] [place] [id number]    This letter provides a written record that you were tested for COVID-19 on 7/12/2020.     Your result was negative. This means that we didnt find the virus that causes COVID-19 in your sample. A test may show negative when you do actually have the virus. This can happen when the virus is in the early stages of infection, before you feel illness symptoms.    If you have symptoms   Stay home and away from others (self-isolate) until you meet ALL of the guidelines below:    Youve had no fever--and no medicine that reduces fever--for 3 full days (72 hours). And ?    Your other symptoms have gotten better. For example, your cough or breathing has improved. And?    At least 10 days have passed since your symptoms started.    During this time:    Stay home. Dont go to work, school or anywhere else.     Stay in your own room, including for meals. Use your own bathroom if you can.    Stay away from others in your home. No hugging, kissing or shaking hands. No visitors.    Clean high touch surfaces often (doorknobs, counters, handles, etc.). Use a household cleaning spray or wipes. You can find a full list on the EPA website at www.epa.gov/pesticide-registration/list-n-disinfectants-use-against-sars-cov-2.    Cover your mouth and nose with a mask, tissue or washcloth to avoid spreading germs.    Wash your hands and face often with soap and water.    Going back to work  Check with your employer for any guidelines to follow for going back to work.    Employers: This document serves as formal notice that your employee tested negative for COVID-19, as of the testing date shown above.

## 2021-09-18 ENCOUNTER — HEALTH MAINTENANCE LETTER (OUTPATIENT)
Age: 51
End: 2021-09-18

## 2021-12-06 DIAGNOSIS — G47.33 OSA (OBSTRUCTIVE SLEEP APNEA): ICD-10-CM

## 2022-04-08 ENCOUNTER — VIRTUAL VISIT (OUTPATIENT)
Dept: FAMILY MEDICINE | Facility: CLINIC | Age: 52
End: 2022-04-08
Payer: COMMERCIAL

## 2022-04-08 DIAGNOSIS — F33.1 MODERATE EPISODE OF RECURRENT MAJOR DEPRESSIVE DISORDER (H): Primary | ICD-10-CM

## 2022-04-08 DIAGNOSIS — N52.2 DRUG-INDUCED ERECTILE DYSFUNCTION: ICD-10-CM

## 2022-04-08 DIAGNOSIS — G47.33 OSA (OBSTRUCTIVE SLEEP APNEA): ICD-10-CM

## 2022-04-08 PROCEDURE — 99213 OFFICE O/P EST LOW 20 MIN: CPT | Mod: 95 | Performed by: INTERNAL MEDICINE

## 2022-04-08 RX ORDER — SILDENAFIL 50 MG/1
50 TABLET, FILM COATED ORAL DAILY PRN
Qty: 30 TABLET | Refills: 3 | Status: SHIPPED | OUTPATIENT
Start: 2022-04-08

## 2022-04-08 RX ORDER — FLUOXETINE 10 MG/1
10 CAPSULE ORAL DAILY
COMMUNITY
End: 2022-04-08

## 2022-04-08 RX ORDER — FLUOXETINE 10 MG/1
10 CAPSULE ORAL DAILY
Qty: 90 CAPSULE | Refills: 3 | Status: SHIPPED | OUTPATIENT
Start: 2022-04-08 | End: 2022-07-07

## 2022-04-08 ASSESSMENT — PATIENT HEALTH QUESTIONNAIRE - PHQ9: SUM OF ALL RESPONSES TO PHQ QUESTIONS 1-9: 3

## 2022-04-08 NOTE — PROGRESS NOTES
Lakhwinder is a 52 year old who is being evaluated via a billable video visit.      How would you like to obtain your AVS? MyChart  If the video visit is dropped, the invitation should be resent by: Text to cell phone: [phone]   Will anyone else be joining your video visit? No      Video Start Time: 0850    Assessment & Plan     Moderate episode of recurrent major depressive disorder (H)  Doing well with PHQ-9.  Very satisfied with his mood symptoms.  - FLUoxetine (PROZAC) 20 MG capsule; Take 1 capsule (20 mg) by mouth daily  - FLUoxetine (PROZAC) 10 MG capsule; Take 1 capsule (10 mg) by mouth daily    PENG (obstructive sleep apnea)  Patient is using CPAP and has had a good response.    Drug-induced erectile dysfunction  Responding to sildenafil  - sildenafil (VIAGRA) 50 MG tablet; Take 1 tablet (50 mg) by mouth daily as needed (Erectile Dysfunction) Take 30-60 minutes before anticipated sexual activity.                 No follow-ups on file.    Eric Austin MD  [facility]   Lakhwinder is a 52 year old who presents for the following health issues     HPI   Patient with erectile dysfunction, sleep apnea, and depression calls for refill of his sildenafil and Viagra.  He is doing well recent PHQ-9 score 3.  Using his CPAP.  Viagra is effective.          Review of Systems   No recent illness.      Objective           Vitals:  No vitals were obtained today due to virtual visit.    Physical Exam   GENERAL: Healthy, alert and no distress  EYES: Eyes grossly normal to inspection.  No discharge or erythema, or obvious scleral/conjunctival abnormalities.  RESP: No audible wheeze, cough, or visible cyanosis.  No visible retractions or increased work of breathing.    SKIN: Visible skin clear. No significant rash, abnormal pigmentation or lesions.  NEURO: Cranial nerves grossly intact.  Mentation and speech appropriate for age.  PSYCH: Mentation appears normal, affect normal/bright,  judgement and insight intact, normal speech and appearance well-groomed.                Video-Visit Details    Type of service:  Video Visit    Video End Time:9:01 AM    Originating Location (pt. Location): Other parked car    Distant Location (provider location):  [facility]     Platform used for Video Visit: Breanna

## 2022-04-30 ENCOUNTER — HEALTH MAINTENANCE LETTER (OUTPATIENT)
Age: 52
End: 2022-04-30

## 2022-11-20 ENCOUNTER — HEALTH MAINTENANCE LETTER (OUTPATIENT)
Age: 52
End: 2022-11-20

## 2023-06-01 ENCOUNTER — HEALTH MAINTENANCE LETTER (OUTPATIENT)
Age: 53
End: 2023-06-01

## 2023-06-27 DIAGNOSIS — F33.1 MODERATE EPISODE OF RECURRENT MAJOR DEPRESSIVE DISORDER (H): ICD-10-CM

## 2023-06-27 RX ORDER — FLUOXETINE 10 MG/1
CAPSULE ORAL
Qty: 90 CAPSULE | Refills: 3 | OUTPATIENT
Start: 2023-06-27

## 2023-06-27 NOTE — TELEPHONE ENCOUNTER
"Patient needs to schedule appointment.    Last office visit: 4/8/2022    Future Appointments 6/27/2023 - 12/24/2023    None          Requested Prescriptions   Pending Prescriptions Disp Refills     FLUoxetine (PROZAC) 10 MG capsule [Pharmacy Med Name: FLUOXETINE HCL 10 MG CAPSULE] 90 capsule 3     Sig: TAKE 1 CAPSULE BY MOUTH EVERY DAY       SSRIs Protocol Failed - 6/27/2023 12:42 AM        Failed - PHQ-9 score less than 5 in past 6 months     Please review last PHQ-9 score.           Failed - Recent (6 mo) or future (30 days) visit within the authorizing provider's specialty     Patient had office visit in the last 6 months or has a visit in the next 30 days with authorizing provider or within the authorizing provider's specialty.  See \"Patient Info\" tab in inbasket, or \"Choose Columns\" in Meds & Orders section of the refill encounter.            Passed - Medication is active on med list        Passed - Patient is age 18 or older           FLUoxetine (PROZAC) 20 MG capsule [Pharmacy Med Name: FLUOXETINE HCL 20 MG CAPSULE] 90 capsule 3     Sig: TAKE 1 CAPSULE BY MOUTH EVERY DAY       SSRIs Protocol Failed - 6/27/2023 12:42 AM        Failed - PHQ-9 score less than 5 in past 6 months     Please review last PHQ-9 score.           Failed - Recent (6 mo) or future (30 days) visit within the authorizing provider's specialty     Patient had office visit in the last 6 months or has a visit in the next 30 days with authorizing provider or within the authorizing provider's specialty.  See \"Patient Info\" tab in [employer], or \"Choose Columns\" in Meds & Orders section of the refill encounter.            Passed - Medication is active on med list        Passed - Patient is age 18 or older                   "

## 2024-06-16 ENCOUNTER — HEALTH MAINTENANCE LETTER (OUTPATIENT)
Age: 54
End: 2024-06-16

## (undated) RX ORDER — FENTANYL CITRATE 50 UG/ML
INJECTION, SOLUTION INTRAMUSCULAR; INTRAVENOUS
Status: DISPENSED
Start: 2020-09-29